# Patient Record
Sex: MALE | Race: WHITE | Employment: UNEMPLOYED | URBAN - METROPOLITAN AREA
[De-identification: names, ages, dates, MRNs, and addresses within clinical notes are randomized per-mention and may not be internally consistent; named-entity substitution may affect disease eponyms.]

---

## 2018-03-16 ENCOUNTER — HOSPITAL ENCOUNTER (EMERGENCY)
Age: 63
Discharge: HOME | End: 2018-03-16
Payer: MEDICAID

## 2018-03-16 VITALS
HEART RATE: 104 BPM | HEIGHT: 68 IN | SYSTOLIC BLOOD PRESSURE: 150 MMHG | TEMPERATURE: 97.6 F | WEIGHT: 147 LBS | OXYGEN SATURATION: 98 % | BODY MASS INDEX: 22.28 KG/M2 | DIASTOLIC BLOOD PRESSURE: 74 MMHG | RESPIRATION RATE: 18 BRPM

## 2018-03-16 DIAGNOSIS — R03.0 ELEVATED BLOOD PRESSURE READING: ICD-10-CM

## 2018-03-16 DIAGNOSIS — B37.0 THRUSH: Primary | ICD-10-CM

## 2018-03-16 LAB
ABSOLUTE EOS #: 0 K/UL (ref 0–0.4)
ABSOLUTE IMMATURE GRANULOCYTE: ABNORMAL K/UL (ref 0–0.3)
ABSOLUTE LYMPH #: 1.1 K/UL (ref 1–4.8)
ABSOLUTE MONO #: 0.5 K/UL (ref 0.2–0.8)
ANION GAP SERPL CALCULATED.3IONS-SCNC: 27 MMOL/L (ref 9–17)
BASOPHILS # BLD: 0 % (ref 0–2)
BASOPHILS ABSOLUTE: 0 K/UL (ref 0–0.2)
BUN BLDV-MCNC: 15 MG/DL (ref 8–23)
BUN/CREAT BLD: 16 (ref 9–20)
CALCIUM SERPL-MCNC: 8.8 MG/DL (ref 8.6–10.4)
CHLORIDE BLD-SCNC: 88 MMOL/L (ref 98–107)
CO2: 18 MMOL/L (ref 20–31)
CREAT SERPL-MCNC: 0.92 MG/DL (ref 0.7–1.2)
DIFFERENTIAL TYPE: ABNORMAL
EOSINOPHILS RELATIVE PERCENT: 0 % (ref 1–4)
GFR AFRICAN AMERICAN: >60 ML/MIN
GFR NON-AFRICAN AMERICAN: >60 ML/MIN
GFR SERPL CREATININE-BSD FRML MDRD: ABNORMAL ML/MIN/{1.73_M2}
GFR SERPL CREATININE-BSD FRML MDRD: ABNORMAL ML/MIN/{1.73_M2}
GLUCOSE BLD-MCNC: 65 MG/DL (ref 70–99)
HCT VFR BLD CALC: 44.3 % (ref 41–53)
HEMOGLOBIN: 14.6 G/DL (ref 13.5–17.5)
IMMATURE GRANULOCYTES: ABNORMAL %
LYMPHOCYTES # BLD: 11 % (ref 24–44)
MCH RBC QN AUTO: 32.2 PG (ref 26–34)
MCHC RBC AUTO-ENTMCNC: 32.9 G/DL (ref 31–37)
MCV RBC AUTO: 97.6 FL (ref 80–100)
MONOCYTES # BLD: 5 % (ref 1–7)
NRBC AUTOMATED: ABNORMAL PER 100 WBC
PDW BLD-RTO: 14.4 % (ref 11.5–14.5)
PLATELET # BLD: 236 K/UL (ref 130–400)
PLATELET ESTIMATE: ABNORMAL
PMV BLD AUTO: 7.7 FL (ref 6–12)
POTASSIUM SERPL-SCNC: 5.5 MMOL/L (ref 3.7–5.3)
RBC # BLD: 4.53 M/UL (ref 4.5–5.9)
RBC # BLD: ABNORMAL 10*6/UL
SEG NEUTROPHILS: 84 % (ref 36–66)
SEGMENTED NEUTROPHILS ABSOLUTE COUNT: 9 K/UL (ref 1.8–7.7)
SODIUM BLD-SCNC: 133 MMOL/L (ref 135–144)
WBC # BLD: 10.8 K/UL (ref 3.5–11)
WBC # BLD: ABNORMAL 10*3/UL

## 2018-03-16 PROCEDURE — 99282 EMERGENCY DEPT VISIT SF MDM: CPT

## 2018-03-16 PROCEDURE — 85025 COMPLETE CBC W/AUTO DIFF WBC: CPT

## 2018-03-16 PROCEDURE — 80048 BASIC METABOLIC PNL TOTAL CA: CPT

## 2018-03-16 RX ORDER — DIPHENHYDRAMINE HCL 25 MG
25 CAPSULE ORAL EVERY 6 HOURS PRN
COMMUNITY
End: 2018-07-30

## 2018-03-16 ASSESSMENT — PAIN DESCRIPTION - PAIN TYPE: TYPE: ACUTE PAIN

## 2018-03-16 ASSESSMENT — PAIN SCALES - GENERAL: PAINLEVEL_OUTOF10: 10

## 2018-03-16 ASSESSMENT — PAIN DESCRIPTION - DESCRIPTORS: DESCRIPTORS: BURNING;TENDER

## 2018-03-17 NOTE — ED PROVIDER NOTES
following:     Glucose 65 (*)     Sodium 133 (*)     Potassium 5.5 (*)     Chloride 88 (*)     CO2 18 (*)     Anion Gap 27 (*)     All other components within normal limits       All other labs were within normal range or not returned as of this dictation. EMERGENCY DEPARTMENT COURSE and DIFFERENTIAL DIAGNOSIS/MDM:   Vitals:    Vitals:    18 1459   BP: (!) 150/74   Pulse: 104   Resp: 18   Temp: 97.6 °F (36.4 °C)   TempSrc: Oral   SpO2: 98%   Weight: 147 lb (66.7 kg)   Height: 5' 8\" (1.727 m)       Medical Decision Makin-year-old male with thrush. This is the second time he has had thrush but denies immunocompromised status. This episode did not resolve with his first treatment of nystatin. He has not seen a physician in several years and some basic blood work was performed without any significant abnormalities. He was discharged home with nystatin. Patient and family at the bedside were educated on the importance of establishing himself with a physician. FINAL IMPRESSION      1. Thrush    2. Elevated blood pressure reading          DISPOSITION/PLAN   DISPOSITION Decision To Discharge 2018 05:09:08 PM      PATIENT REFERRED TO:   Follow-up with a primary care provider  [id number]-DAY          DISCHARGE MEDICATIONS:     Discharge Medication List as of 3/16/2018  5:14 PM      START taking these medications    Details   nystatin (MYCOSTATIN) 794082 UNIT/ML suspension Take 5 mLs by mouth 4 times daily for 10 days Take 5 mL's by mouth 4 times daily. Swish and spit, Oral, 4 TIMES DAILY Starting Fri 3/16/2018, Until Mon 3/26/2018, 40 doses, Disp-200 mL, R-0, Print             (Please note that portions of this note were completed with a voice recognition program.  Efforts were made to edit the dictations but occasionally words are mis-transcribed. )    RICHELLE CALVO NP  [id number]

## 2018-03-19 ASSESSMENT — ENCOUNTER SYMPTOMS
COUGH: 0
CHEST TIGHTNESS: 0
SHORTNESS OF BREATH: 0
ABDOMINAL DISTENTION: 0
NAUSEA: 1
VOMITING: 0
PHOTOPHOBIA: 0

## 2018-03-19 NOTE — ED PROVIDER NOTES
ULTRASOUND:   Performed by ED Physician - none    LABS:  Labs Reviewed   CBC WITH AUTO DIFFERENTIAL - Abnormal; Notable for the following:        Result Value    Seg Neutrophils 84 (*)     Lymphocytes 11 (*)     Eosinophils % 0 (*)     Segs Absolute 9.00 (*)     All other components within normal limits   BASIC METABOLIC PANEL - Abnormal; Notable for the following:     Glucose 65 (*)     Sodium 133 (*)     Potassium 5.5 (*)     Chloride 88 (*)     CO2 18 (*)     Anion Gap 27 (*)     All other components within normal limits       All other labs were within normal range or not returned as of this dictation. EMERGENCY DEPARTMENT COURSE and DIFFERENTIAL DIAGNOSIS/MDM:   Vitals:    Vitals:    03/16/18 1459   BP: (!) 150/74   Pulse: 104   Resp: 18   Temp: 97.6 °F (36.4 °C)   TempSrc: Oral   SpO2: 98%   Weight: 147 lb (66.7 kg)   Height: 5' 8\" (1.727 m)     Does have exudative lesions in the mouth that are consistent with oral candidiasis. His blood pressure was also noted to be elevated and we did have a discussion about blood pressure monitoring and follow-up care I don't think at this point that we need to initiate antihypertensive medication. CONSULTS:  None    PROCEDURES:  Not clinically indicated. FINAL IMPRESSION      1. Thrush    2. Elevated blood pressure reading          DISPOSITION/PLAN   DISPOSITION Decision To Discharge 03/16/2018 05:09:08 PM      PATIENT REFERRED TO:   Follow-up with a primary care provider  [id number]-DAY          DISCHARGE MEDICATIONS:     Discharge Medication List as of 3/16/2018  5:14 PM      START taking these medications    Details   nystatin (MYCOSTATIN) [id number] UNIT/ML suspension Take 5 mLs by mouth 4 times daily for 10 days Take 5 mL's by mouth 4 times daily.   Swish and spit, Oral, 4 TIMES DAILY Starting Fri 3/16/2018, Until Mon 3/26/2018, 40 doses, Disp-200 mL, R-0, Print                 (Please note that portions of this note were completed with a voice recognition

## 2018-03-21 ENCOUNTER — OFFICE VISIT (OUTPATIENT)
Dept: FAMILY MEDICINE CLINIC | Age: 63
End: 2018-03-21
Payer: MEDICAID

## 2018-03-21 VITALS
HEART RATE: 72 BPM | HEIGHT: 68 IN | WEIGHT: 153.6 LBS | SYSTOLIC BLOOD PRESSURE: 158 MMHG | BODY MASS INDEX: 23.28 KG/M2 | OXYGEN SATURATION: 99 % | DIASTOLIC BLOOD PRESSURE: 85 MMHG | RESPIRATION RATE: 16 BRPM

## 2018-03-21 DIAGNOSIS — J44.9 CHRONIC OBSTRUCTIVE PULMONARY DISEASE, UNSPECIFIED COPD TYPE (HCC): ICD-10-CM

## 2018-03-21 DIAGNOSIS — Z76.89 ENCOUNTER TO ESTABLISH CARE: Primary | ICD-10-CM

## 2018-03-21 DIAGNOSIS — F17.200 SMOKING: ICD-10-CM

## 2018-03-21 DIAGNOSIS — I10 ESSENTIAL HYPERTENSION: ICD-10-CM

## 2018-03-21 DIAGNOSIS — B37.0 CANDIDAL STOMATITIS: ICD-10-CM

## 2018-03-21 PROCEDURE — 99204 OFFICE O/P NEW MOD 45 MIN: CPT | Performed by: FAMILY MEDICINE

## 2018-03-21 RX ORDER — CHLORTHALIDONE 25 MG/1
25 TABLET ORAL DAILY
Qty: 30 TABLET | Refills: 1 | Status: SHIPPED | OUTPATIENT
Start: 2018-03-21 | End: 2018-03-27 | Stop reason: ALTCHOICE

## 2018-03-21 RX ORDER — VARENICLINE TARTRATE 25 MG
KIT ORAL
Qty: 1 EACH | Refills: 0 | Status: SHIPPED | OUTPATIENT
Start: 2018-03-21 | End: 2018-10-30

## 2018-03-21 ASSESSMENT — ENCOUNTER SYMPTOMS
BLOOD IN STOOL: 0
EYE PAIN: 0
SHORTNESS OF BREATH: 0
WHEEZING: 0

## 2018-03-21 NOTE — PROGRESS NOTES
stomatitis  Healing well with nystatin, encouraged him to complete the nystatin course. Follow-up in one month to recheck blood pressure and follow-up on Chantix for smoking. Fit kit given, he is interested in colonoscopy once he gets approved for his insurance    Flu shot declined. Recommended heading to the health department for the Pneumovax vaccine. Call or return to clinic prn if these symptoms worsen or fail to improve as anticipated. I have reviewed the instructions with the patient, answering all questions to her satisfaction.     Electronically signed by Rosalva Mejia MD on 3/21/2018 at 10:46 AM

## 2018-03-26 ENCOUNTER — HOSPITAL ENCOUNTER (OUTPATIENT)
Age: 63
Setting detail: SPECIMEN
Discharge: HOME | End: 2018-03-26
Payer: MEDICAID

## 2018-03-26 DIAGNOSIS — Z76.89 ENCOUNTER TO ESTABLISH CARE: ICD-10-CM

## 2018-03-26 LAB
ABSOLUTE EOS #: 0.58 K/UL (ref 0–0.44)
ABSOLUTE IMMATURE GRANULOCYTE: 0.07 K/UL (ref 0–0.3)
ABSOLUTE LYMPH #: 1.76 K/UL (ref 1.1–3.7)
ABSOLUTE MONO #: 1.03 K/UL (ref 0.1–1.2)
ALBUMIN SERPL-MCNC: 4.8 G/DL (ref 3.5–5.2)
ALBUMIN/GLOBULIN RATIO: 1.7 (ref 1–2.5)
ALP BLD-CCNC: 66 U/L (ref 40–129)
ALT SERPL-CCNC: 75 U/L (ref 5–41)
ANION GAP SERPL CALCULATED.3IONS-SCNC: 19 MMOL/L (ref 9–17)
AST SERPL-CCNC: 56 U/L
BASOPHILS # BLD: 1 % (ref 0–2)
BASOPHILS ABSOLUTE: 0.06 K/UL (ref 0–0.2)
BILIRUB SERPL-MCNC: 1.18 MG/DL (ref 0.3–1.2)
BUN BLDV-MCNC: 14 MG/DL (ref 8–23)
BUN/CREAT BLD: ABNORMAL (ref 9–20)
CALCIUM SERPL-MCNC: 9.5 MG/DL (ref 8.6–10.4)
CHLORIDE BLD-SCNC: 81 MMOL/L (ref 98–107)
CHOLESTEROL/HDL RATIO: 1.9
CHOLESTEROL: 184 MG/DL
CO2: 27 MMOL/L (ref 20–31)
CREAT SERPL-MCNC: 0.75 MG/DL (ref 0.7–1.2)
DIFFERENTIAL TYPE: ABNORMAL
EOSINOPHILS RELATIVE PERCENT: 7 % (ref 1–4)
GFR AFRICAN AMERICAN: >60 ML/MIN
GFR NON-AFRICAN AMERICAN: >60 ML/MIN
GFR SERPL CREATININE-BSD FRML MDRD: ABNORMAL ML/MIN/{1.73_M2}
GFR SERPL CREATININE-BSD FRML MDRD: ABNORMAL ML/MIN/{1.73_M2}
GLUCOSE BLD-MCNC: 76 MG/DL (ref 70–99)
HCT VFR BLD CALC: 43.3 % (ref 40.7–50.3)
HDLC SERPL-MCNC: 99 MG/DL
HEMOGLOBIN: 14.8 G/DL (ref 13–17)
HEPATITIS B SURFACE ANTIGEN: NONREACTIVE
HEPATITIS C ANTIBODY: NONREACTIVE
HIV AG/AB: NONREACTIVE
IMMATURE GRANULOCYTES: 1 %
LDL CHOLESTEROL: 70 MG/DL (ref 0–130)
LYMPHOCYTES # BLD: 22 % (ref 24–43)
MCH RBC QN AUTO: 32.2 PG (ref 25.2–33.5)
MCHC RBC AUTO-ENTMCNC: 34.2 G/DL (ref 28.4–34.8)
MCV RBC AUTO: 94.3 FL (ref 82.6–102.9)
MONOCYTES # BLD: 13 % (ref 3–12)
NRBC AUTOMATED: 0 PER 100 WBC
PDW BLD-RTO: 12.9 % (ref 11.8–14.4)
PLATELET # BLD: 279 K/UL (ref 138–453)
PLATELET ESTIMATE: ABNORMAL
PMV BLD AUTO: 10 FL (ref 8.1–13.5)
POTASSIUM SERPL-SCNC: 4 MMOL/L (ref 3.7–5.3)
RBC # BLD: 4.59 M/UL (ref 4.21–5.77)
RBC # BLD: ABNORMAL 10*6/UL
SEG NEUTROPHILS: 56 % (ref 36–65)
SEGMENTED NEUTROPHILS ABSOLUTE COUNT: 4.62 K/UL (ref 1.5–8.1)
SODIUM BLD-SCNC: 127 MMOL/L (ref 135–144)
T. PALLIDUM, IGG: NONREACTIVE
TOTAL PROTEIN: 7.6 G/DL (ref 6.4–8.3)
TRIGL SERPL-MCNC: 75 MG/DL
TSH SERPL DL<=0.05 MIU/L-ACNC: 2.99 MIU/L (ref 0.3–5)
VLDLC SERPL CALC-MCNC: NORMAL MG/DL (ref 1–30)
WBC # BLD: 8.1 K/UL (ref 3.5–11.3)
WBC # BLD: ABNORMAL 10*3/UL

## 2018-03-27 DIAGNOSIS — R79.89 LOW SERUM SODIUM: ICD-10-CM

## 2018-03-27 DIAGNOSIS — E78.5 HYPERLIPIDEMIA, UNSPECIFIED HYPERLIPIDEMIA TYPE: Primary | ICD-10-CM

## 2018-03-27 DIAGNOSIS — R74.8 LIVER ENZYME ELEVATION: ICD-10-CM

## 2018-03-27 DIAGNOSIS — I10 ESSENTIAL HYPERTENSION: ICD-10-CM

## 2018-03-27 RX ORDER — ATORVASTATIN CALCIUM 40 MG/1
40 TABLET, FILM COATED ORAL DAILY
Qty: 90 TABLET | Refills: 3 | Status: SHIPPED | OUTPATIENT
Start: 2018-03-27 | End: 2019-03-25 | Stop reason: SDUPTHER

## 2018-03-27 RX ORDER — AMLODIPINE BESYLATE 5 MG/1
5 TABLET ORAL DAILY
Qty: 30 TABLET | Refills: 3 | Status: SHIPPED | OUTPATIENT
Start: 2018-03-27 | End: 2018-04-16 | Stop reason: SDUPTHER

## 2018-04-13 ENCOUNTER — HOSPITAL ENCOUNTER (OUTPATIENT)
Age: 63
Setting detail: SPECIMEN
Discharge: HOME | End: 2018-04-13
Payer: MEDICAID

## 2018-04-13 DIAGNOSIS — R79.89 LOW SERUM SODIUM: ICD-10-CM

## 2018-04-13 DIAGNOSIS — R74.8 LIVER ENZYME ELEVATION: ICD-10-CM

## 2018-04-13 LAB
ALBUMIN SERPL-MCNC: 4.1 G/DL (ref 3.5–5.2)
ALBUMIN/GLOBULIN RATIO: 1.5 (ref 1–2.5)
ALP BLD-CCNC: 64 U/L (ref 40–129)
ALT SERPL-CCNC: 76 U/L (ref 5–41)
ANION GAP SERPL CALCULATED.3IONS-SCNC: 20 MMOL/L (ref 9–17)
AST SERPL-CCNC: 75 U/L
BILIRUB SERPL-MCNC: 0.97 MG/DL (ref 0.3–1.2)
BILIRUBIN DIRECT: 0.28 MG/DL
BILIRUBIN, INDIRECT: 0.69 MG/DL (ref 0–1)
CHLORIDE BLD-SCNC: 93 MMOL/L (ref 98–107)
CO2: 24 MMOL/L (ref 20–31)
CREAT SERPL-MCNC: 0.71 MG/DL (ref 0.7–1.2)
GFR AFRICAN AMERICAN: >60 ML/MIN
GFR NON-AFRICAN AMERICAN: >60 ML/MIN
GFR SERPL CREATININE-BSD FRML MDRD: NORMAL ML/MIN/{1.73_M2}
GFR SERPL CREATININE-BSD FRML MDRD: NORMAL ML/MIN/{1.73_M2}
GGT: 92 U/L (ref 8–61)
GLOBULIN: ABNORMAL G/DL (ref 1.5–3.8)
POTASSIUM SERPL-SCNC: 4.4 MMOL/L (ref 3.7–5.3)
SERUM OSMOLALITY: 280 MOSM/KG (ref 275–295)
SODIUM BLD-SCNC: 137 MMOL/L (ref 135–144)
TOTAL PROTEIN: 6.8 G/DL (ref 6.4–8.3)

## 2018-04-16 ENCOUNTER — OFFICE VISIT (OUTPATIENT)
Dept: FAMILY MEDICINE CLINIC | Age: 63
End: 2018-04-16
Payer: MEDICAID

## 2018-04-16 VITALS
HEIGHT: 67 IN | WEIGHT: 150.4 LBS | HEART RATE: 76 BPM | BODY MASS INDEX: 23.61 KG/M2 | DIASTOLIC BLOOD PRESSURE: 73 MMHG | SYSTOLIC BLOOD PRESSURE: 147 MMHG

## 2018-04-16 DIAGNOSIS — L30.9 DERMATITIS: ICD-10-CM

## 2018-04-16 DIAGNOSIS — R74.8 LIVER ENZYME ELEVATION: Primary | ICD-10-CM

## 2018-04-16 DIAGNOSIS — K13.29 TONGUE PLAQUE: Primary | ICD-10-CM

## 2018-04-16 DIAGNOSIS — I10 ESSENTIAL HYPERTENSION: ICD-10-CM

## 2018-04-16 DIAGNOSIS — R74.8 LIVER ENZYME ELEVATION: ICD-10-CM

## 2018-04-16 PROCEDURE — 99214 OFFICE O/P EST MOD 30 MIN: CPT | Performed by: FAMILY MEDICINE

## 2018-04-16 RX ORDER — BETAMETHASONE DIPROPIONATE 0.05 %
OINTMENT (GRAM) TOPICAL
Qty: 50 G | Refills: 1 | Status: SHIPPED | OUTPATIENT
Start: 2018-04-16 | End: 2018-10-30

## 2018-04-16 RX ORDER — AMLODIPINE BESYLATE 10 MG/1
10 TABLET ORAL DAILY
Qty: 30 TABLET | Refills: 3 | Status: SHIPPED | OUTPATIENT
Start: 2018-04-16 | End: 2018-07-30 | Stop reason: SDUPTHER

## 2018-04-16 RX ORDER — HYDROXYZINE HYDROCHLORIDE 25 MG/1
25 TABLET, FILM COATED ORAL 3 TIMES DAILY PRN
Qty: 60 TABLET | Refills: 0 | Status: SHIPPED | OUTPATIENT
Start: 2018-04-16 | End: 2018-04-26

## 2018-04-16 ASSESSMENT — ENCOUNTER SYMPTOMS
EYE PAIN: 0
SHORTNESS OF BREATH: 0
BLOOD IN STOOL: 0

## 2018-04-16 ASSESSMENT — PATIENT HEALTH QUESTIONNAIRE - PHQ9
1. LITTLE INTEREST OR PLEASURE IN DOING THINGS: 0
SUM OF ALL RESPONSES TO PHQ QUESTIONS 1-9: 0
SUM OF ALL RESPONSES TO PHQ9 QUESTIONS 1 & 2: 0
2. FEELING DOWN, DEPRESSED OR HOPELESS: 0

## 2018-04-26 ENCOUNTER — HOSPITAL ENCOUNTER (OUTPATIENT)
Dept: ULTRASOUND IMAGING | Age: 63
Discharge: HOME | End: 2018-04-28
Payer: MEDICAID

## 2018-04-26 DIAGNOSIS — R74.8 LIVER ENZYME ELEVATION: ICD-10-CM

## 2018-04-26 PROCEDURE — 76705 ECHO EXAM OF ABDOMEN: CPT

## 2018-04-30 ENCOUNTER — OFFICE VISIT (OUTPATIENT)
Dept: FAMILY MEDICINE CLINIC | Age: 63
End: 2018-04-30
Payer: MEDICAID

## 2018-04-30 VITALS
HEART RATE: 89 BPM | SYSTOLIC BLOOD PRESSURE: 138 MMHG | OXYGEN SATURATION: 95 % | BODY MASS INDEX: 23.39 KG/M2 | HEIGHT: 67 IN | DIASTOLIC BLOOD PRESSURE: 82 MMHG | WEIGHT: 149 LBS | RESPIRATION RATE: 16 BRPM

## 2018-04-30 DIAGNOSIS — F17.200 SMOKING: ICD-10-CM

## 2018-04-30 DIAGNOSIS — R74.8 LIVER ENZYME ELEVATION: Primary | ICD-10-CM

## 2018-04-30 DIAGNOSIS — I10 ESSENTIAL HYPERTENSION: ICD-10-CM

## 2018-04-30 DIAGNOSIS — K13.29 TONGUE PLAQUE: ICD-10-CM

## 2018-04-30 PROBLEM — B37.0 CANDIDAL STOMATITIS: Status: RESOLVED | Noted: 2018-03-21 | Resolved: 2018-04-30

## 2018-04-30 PROCEDURE — 99214 OFFICE O/P EST MOD 30 MIN: CPT | Performed by: FAMILY MEDICINE

## 2018-04-30 RX ORDER — FLUCONAZOLE 100 MG/1
TABLET ORAL
Qty: 22 TABLET | Refills: 0 | Status: SHIPPED | OUTPATIENT
Start: 2018-04-30 | End: 2018-07-30

## 2018-04-30 ASSESSMENT — ENCOUNTER SYMPTOMS
BLOOD IN STOOL: 0
SHORTNESS OF BREATH: 0
EYE PAIN: 0

## 2018-05-15 ENCOUNTER — HOSPITAL ENCOUNTER (OUTPATIENT)
Age: 63
Setting detail: SPECIMEN
Discharge: HOME | End: 2018-05-15
Payer: MEDICAID

## 2018-05-15 DIAGNOSIS — R74.8 LIVER ENZYME ELEVATION: ICD-10-CM

## 2018-05-15 LAB
ALBUMIN SERPL-MCNC: 4.3 G/DL (ref 3.5–5.2)
ALBUMIN/GLOBULIN RATIO: 1.7 (ref 1–2.5)
ALP BLD-CCNC: 53 U/L (ref 40–129)
ALT SERPL-CCNC: 41 U/L (ref 5–41)
ANION GAP SERPL CALCULATED.3IONS-SCNC: 14 MMOL/L (ref 9–17)
AST SERPL-CCNC: 37 U/L
BILIRUB SERPL-MCNC: 0.39 MG/DL (ref 0.3–1.2)
BILIRUBIN DIRECT: 0.13 MG/DL
BILIRUBIN, INDIRECT: 0.26 MG/DL (ref 0–1)
CHLORIDE BLD-SCNC: 99 MMOL/L (ref 98–107)
CO2: 23 MMOL/L (ref 20–31)
CREAT SERPL-MCNC: 0.66 MG/DL (ref 0.7–1.2)
GFR AFRICAN AMERICAN: >60 ML/MIN
GFR NON-AFRICAN AMERICAN: >60 ML/MIN
GFR SERPL CREATININE-BSD FRML MDRD: ABNORMAL ML/MIN/{1.73_M2}
GFR SERPL CREATININE-BSD FRML MDRD: ABNORMAL ML/MIN/{1.73_M2}
GLOBULIN: NORMAL G/DL (ref 1.5–3.8)
POTASSIUM SERPL-SCNC: 4 MMOL/L (ref 3.7–5.3)
SODIUM BLD-SCNC: 136 MMOL/L (ref 135–144)
TOTAL PROTEIN: 6.8 G/DL (ref 6.4–8.3)

## 2018-05-31 ENCOUNTER — OFFICE VISIT (OUTPATIENT)
Dept: DERMATOLOGY | Age: 63
End: 2018-05-31
Payer: MEDICAID

## 2018-05-31 VITALS
SYSTOLIC BLOOD PRESSURE: 136 MMHG | DIASTOLIC BLOOD PRESSURE: 79 MMHG | BODY MASS INDEX: 23.39 KG/M2 | HEIGHT: 67 IN | HEART RATE: 82 BPM | WEIGHT: 149 LBS | OXYGEN SATURATION: 96 %

## 2018-05-31 DIAGNOSIS — K13.0 CHEILITIS: ICD-10-CM

## 2018-05-31 DIAGNOSIS — B37.0 THRUSH: ICD-10-CM

## 2018-05-31 DIAGNOSIS — L30.8 ASTEATOTIC ECZEMA: Primary | ICD-10-CM

## 2018-05-31 PROCEDURE — 3017F COLORECTAL CA SCREEN DOC REV: CPT | Performed by: DERMATOLOGY

## 2018-05-31 PROCEDURE — 99202 OFFICE O/P NEW SF 15 MIN: CPT | Performed by: DERMATOLOGY

## 2018-05-31 PROCEDURE — G8420 CALC BMI NORM PARAMETERS: HCPCS | Performed by: DERMATOLOGY

## 2018-05-31 PROCEDURE — G8427 DOCREV CUR MEDS BY ELIG CLIN: HCPCS | Performed by: DERMATOLOGY

## 2018-05-31 PROCEDURE — 4004F PT TOBACCO SCREEN RCVD TLK: CPT | Performed by: DERMATOLOGY

## 2018-07-30 ENCOUNTER — OFFICE VISIT (OUTPATIENT)
Dept: FAMILY MEDICINE CLINIC | Age: 63
End: 2018-07-30
Payer: MEDICAID

## 2018-07-30 VITALS
BODY MASS INDEX: 23.67 KG/M2 | DIASTOLIC BLOOD PRESSURE: 80 MMHG | HEIGHT: 67 IN | SYSTOLIC BLOOD PRESSURE: 137 MMHG | WEIGHT: 150.8 LBS | RESPIRATION RATE: 16 BRPM | HEART RATE: 66 BPM | OXYGEN SATURATION: 98 %

## 2018-07-30 DIAGNOSIS — I10 ESSENTIAL HYPERTENSION: Primary | ICD-10-CM

## 2018-07-30 DIAGNOSIS — Z23 NEED FOR SHINGLES VACCINE: ICD-10-CM

## 2018-07-30 DIAGNOSIS — Z12.11 SCREEN FOR COLON CANCER: ICD-10-CM

## 2018-07-30 DIAGNOSIS — F17.200 SMOKING: ICD-10-CM

## 2018-07-30 PROBLEM — K13.29 TONGUE PLAQUE: Status: RESOLVED | Noted: 2018-04-16 | Resolved: 2018-07-30

## 2018-07-30 PROBLEM — R74.8 LIVER ENZYME ELEVATION: Status: RESOLVED | Noted: 2018-03-27 | Resolved: 2018-07-30

## 2018-07-30 PROCEDURE — G8427 DOCREV CUR MEDS BY ELIG CLIN: HCPCS | Performed by: FAMILY MEDICINE

## 2018-07-30 PROCEDURE — 99406 BEHAV CHNG SMOKING 3-10 MIN: CPT | Performed by: FAMILY MEDICINE

## 2018-07-30 PROCEDURE — G8420 CALC BMI NORM PARAMETERS: HCPCS | Performed by: FAMILY MEDICINE

## 2018-07-30 PROCEDURE — 99213 OFFICE O/P EST LOW 20 MIN: CPT | Performed by: FAMILY MEDICINE

## 2018-07-30 PROCEDURE — 3017F COLORECTAL CA SCREEN DOC REV: CPT | Performed by: FAMILY MEDICINE

## 2018-07-30 PROCEDURE — 4004F PT TOBACCO SCREEN RCVD TLK: CPT | Performed by: FAMILY MEDICINE

## 2018-07-30 RX ORDER — BUPROPION HYDROCHLORIDE 150 MG/1
150 TABLET, EXTENDED RELEASE ORAL 2 TIMES DAILY
Qty: 60 TABLET | Refills: 2 | Status: SHIPPED | OUTPATIENT
Start: 2018-07-30 | End: 2019-03-20

## 2018-07-30 RX ORDER — AMLODIPINE BESYLATE 10 MG/1
10 TABLET ORAL DAILY
Qty: 90 TABLET | Refills: 1 | Status: SHIPPED | OUTPATIENT
Start: 2018-07-30 | End: 2018-10-30 | Stop reason: SDUPTHER

## 2018-07-30 ASSESSMENT — ENCOUNTER SYMPTOMS: SHORTNESS OF BREATH: 0

## 2018-07-30 NOTE — PROGRESS NOTES
then 0.5 mg twice daily for 4 days, and then 1 mg twice daily for the remainder of a 12-week course., Disp: 1 each, Rfl: 0    Subjective:     Review of Systems   Constitutional: Negative for fever. Respiratory: Negative for shortness of breath. Cardiovascular: Negative for chest pain and leg swelling. Objective:     /80   Pulse 66   Resp 16   Ht 5' 7.01\" (1.702 m)   Wt 150 lb 12.8 oz (68.4 kg)   SpO2 98%   BMI 23.61 kg/m²     Physical Exam   Constitutional: He is oriented to person, place, and time. He appears well-developed. Eyes: Conjunctivae and EOM are normal.   Cardiovascular: Normal heart sounds. No murmur heard. Pulmonary/Chest: Effort normal and breath sounds normal. No respiratory distress. He has no wheezes. Neurological: He is alert and oriented to person, place, and time. Skin: He is not diaphoretic. Psychiatric: He has a normal mood and affect. His behavior is normal. Judgment and thought content normal.       Assessment & Plan:      1. Essential hypertension  BP at target, continue norvasc  - amLODIPine (NORVASC) 10 MG tablet; Take 1 tablet by mouth daily  Dispense: 90 tablet; Refill: 1    2. Smoking  Had a min 3 min discussion regarding smoking cessation options, side effects, and behavioral counseling. Reviewed chantix, buproprion, nicotine replacement. Behavioral modifications - don't smoke in the house or in the car. Stay active. Find support in family members and friends. Follow up in 1 month. Sooner if needed. - [place] TOBACCO USE CESSATION INTERMEDIATE 3-10 MINUTES  - buPROPion (ZYBAN) 150 MG extended release tablet; Take 1 tablet by mouth 2 times daily  Dispense: 60 tablet; Refill: 2    3. Screen for colon cancer  - Viktoriya Silvestre MD, [facility]*    4. Need for shingles vaccine  - zoster recombinant adjuvanted vaccine (SHINGRIX) 50 MCG SUSR injection;  Inject 0.5 mLs into the muscle once for 1 dose  Dispense: 0.5 mL; Refill: 1    Recommended heading to the health department for the Pneumovax vaccine. Call or return to clinic prn if these symptoms worsen or fail to improve as anticipated. I have reviewed the instructions with the patient, answering all questions to their satisfaction.     Electronically signed by Stephen Carvajal MD on 7/30/2018 at 9:29 AM

## 2018-07-30 NOTE — PROGRESS NOTES
Visit Information    Have you changed or started any medications since your last visit including any over-the-counter medicines, vitamins, or herbal medicines? no   Are you having any side effects from any of your medications? -  no  Have you stopped taking any of your medications? Is so, why? -  no    Have you seen any other physician or provider since your last visit? No  Have you had any other diagnostic tests since your last visit? No  Have you been seen in the emergency room and/or had an admission to a hospital since we last saw you? No  Have you had your routine dental cleaning in the past 6 months? no    Have you activated your [employer] account? If not, what are your barriers?  Yes     Patient Care Team:  Harmony Jordan MD as PCP - General (Family Medicine)    Medical History Review  Past Medical, Family, and Social History reviewed and does not contribute to the patient presenting condition    Health Maintenance   Topic Date Due    DTaP/Tdap/Td vaccine (1 - Tdap) 06/05/1976    Pneumococcal med risk (1 of 1 - PPSV23) 06/05/1976    Shingles Vaccine (1 of 2 - 2 Dose Series) 06/05/2007    Colon cancer screen colonoscopy  06/05/2007    Flu vaccine (1) 09/01/2018    Potassium monitoring  05/15/2019    Creatinine monitoring  05/15/2019    Lipid screen  03/26/2023    Hepatitis C screen  Completed    HIV screen  Completed

## 2018-08-21 ENCOUNTER — APPOINTMENT (OUTPATIENT)
Dept: GENERAL RADIOLOGY | Age: 63
End: 2018-08-21
Payer: MEDICAID

## 2018-08-21 ENCOUNTER — HOSPITAL ENCOUNTER (EMERGENCY)
Age: 63
Discharge: HOME | End: 2018-08-21
Attending: EMERGENCY MEDICINE
Payer: MEDICAID

## 2018-08-21 VITALS
SYSTOLIC BLOOD PRESSURE: 144 MMHG | HEART RATE: 88 BPM | RESPIRATION RATE: 18 BRPM | BODY MASS INDEX: 23.54 KG/M2 | DIASTOLIC BLOOD PRESSURE: 78 MMHG | TEMPERATURE: 98 F | WEIGHT: 150 LBS | OXYGEN SATURATION: 98 % | HEIGHT: 67 IN

## 2018-08-21 DIAGNOSIS — S92.415A CLOSED NONDISPLACED FRACTURE OF PROXIMAL PHALANX OF LEFT GREAT TOE, INITIAL ENCOUNTER: Primary | ICD-10-CM

## 2018-08-21 DIAGNOSIS — M25.562 ACUTE PAIN OF LEFT KNEE: ICD-10-CM

## 2018-08-21 PROCEDURE — 99283 EMERGENCY DEPT VISIT LOW MDM: CPT

## 2018-08-21 PROCEDURE — 73630 X-RAY EXAM OF FOOT: CPT

## 2018-08-21 PROCEDURE — 73562 X-RAY EXAM OF KNEE 3: CPT

## 2018-08-21 PROCEDURE — 93971 EXTREMITY STUDY: CPT

## 2018-08-21 RX ORDER — ACETAMINOPHEN AND CODEINE PHOSPHATE 300; 30 MG/1; MG/1
1 TABLET ORAL EVERY 4 HOURS PRN
Qty: 15 TABLET | Refills: 0 | Status: SHIPPED | OUTPATIENT
Start: 2018-08-21 | End: 2018-08-24

## 2018-08-21 RX ORDER — IBUPROFEN 800 MG/1
800 TABLET ORAL EVERY 8 HOURS PRN
Qty: 20 TABLET | Refills: 0 | Status: SHIPPED | OUTPATIENT
Start: 2018-08-21 | End: 2020-02-24

## 2018-08-21 ASSESSMENT — PAIN SCALES - GENERAL: PAINLEVEL_OUTOF10: 10

## 2018-08-21 ASSESSMENT — PAIN DESCRIPTION - DESCRIPTORS: DESCRIPTORS: ACHING;TENDER;THROBBING

## 2018-08-21 ASSESSMENT — PAIN DESCRIPTION - ORIENTATION: ORIENTATION: LEFT

## 2018-08-21 ASSESSMENT — ENCOUNTER SYMPTOMS: COLOR CHANGE: 1

## 2018-08-21 ASSESSMENT — PAIN DESCRIPTION - LOCATION: LOCATION: KNEE;TOE (COMMENT WHICH ONE)

## 2018-08-21 NOTE — ED NOTES
Pt to ED with knee ankle and foot pain, denies injury states he has Hx of gout, no swelling or contusion noted. Pt has been drinking for a few days.      Linda Dalton RN  08/21/18 0139

## 2018-08-21 NOTE — ED PROVIDER NOTES
[facility] ED  eMERGENCY dEPARTMENT eNCOUnter      Pt Name: Dave Crooks  MRN: [id number]  [place] 1955  Date of evaluation: 8/21/2018  Provider: Jacquelin Sanchez       Chief Complaint   Patient presents with    Knee Pain     lt knee and lt great toe pain with redness/swelling today(denies injury)         HISTORY OF PRESENT ILLNESS  (Location/Symptom, Timing/Onset, Context/Setting, Quality, Duration, Modifying Factors, Severity.)   Dave Crooks is a 66 y.o. male who presents to the emergency department By private auto for evaluation of left knee pain and swelling and left great toe pain and swelling at the patient noticed today when he woke up. He denies injuring himself. His pain as a 10 out of 10 and is aggravated with walking. He denies fever or chills. No known history of gout. Nursing Notes were reviewed. PAST MEDICAL HISTORY     Past Medical History:   Diagnosis Date    COPD (chronic obstructive pulmonary disease) ([facility] [address]          SURGICAL HISTORY       Past Surgical History:   Procedure Laterality Date    JOINT REPLACEMENT      lt hip         CURRENT MEDICATIONS       Discharge Medication List as of 8/21/2018  1:14 PM      CONTINUE these medications which have NOT CHANGED    Details   amLODIPine (NORVASC) 10 MG tablet Take 1 tablet by mouth daily, Disp-90 tablet, R-1Normal      buPROPion (ZYBAN) 150 MG extended release tablet Take 1 tablet by mouth 2 times daily, Disp-60 tablet, R-2Normal      betamethasone dipropionate (DIPROLENE) 0.05 % ointment Apply topically daily. , Disp-50 g, R-1, Normal      atorvastatin (LIPITOR) 40 MG tablet Take 1 tablet by mouth daily, Disp-90 tablet, R-3Normal      varenicline (CHANTIX STARTING MONTH PAK) 0.5 MG X 11 & 1 MG X 42 tablet Take 0.5 mg daily for 3 days, then 0.5 mg twice daily for 4 days, and then 1 mg twice daily for the remainder of a 12-week course., Disp-1 each, R-0Normal             ALLERGIES     Patient has swelling. Tenderness found. Lateral joint line tenderness noted. Left ankle: Normal.        Legs:       Left foot: There is decreased range of motion, tenderness and swelling. Feet:    Neurological: He is alert and oriented to person, place, and time. He has normal reflexes. No cranial nerve deficit. Coordination normal.   Skin: Skin is warm, dry and intact. There is erythema. Psychiatric: He has a normal mood and affect. His behavior is normal. Judgment and thought content normal.         DIAGNOSTIC RESULTS     EKG: All EKG's are interpreted by the Emergency Department Physician who either signs or Co-signs this chart in the absence of a cardiologist.        RADIOLOGY:   Non-plain film images such as CT, Ultrasound and MRI are read by the radiologist. July [place] radiographic images are visualized and preliminarily interpreted by the emergency physician with the below findings:    Xr Knee Left (3 Views)    Result Date: 8/21/2018  EXAMINATION: 3 XRAY VIEWS OF THE LEFT KNEE; 3 XRAY VIEWS OF THE LEFT FOOT 8/21/2018 12:10 pm COMPARISON: None. HISTORY: ORDERING SYSTEM PROVIDED HISTORY: Pain and swelling today, denies injury TECHNOLOGIST PROVIDED HISTORY: Pain and swelling today, denies injury Ordering Physician Provided Reason for Exam: Left knee pain and swelling x 1 day. No injury. Acuity: Acute Type of Exam: Initial FINDINGS: Left knee: Mineralization and alignment are satisfactory. No acute fracture, dislocation, or effusion is noted. Left foot: A nondisplaced obliques fracture through the medial base of the 1st proximal phalanx is noted. No dislocation is noted. Mild degenerative changes involve the interphalangeal joints. The patient has a large talar bunion with associated soft tissue swelling. 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 Left knee: No acute osseous abnormality. Left foot: Nondisplaced obliques fracture medial base of the 1st proximal phalanx. none    LABS:  Labs Reviewed - No data to display    All other labs were within normal range or not returned as of this dictation. EMERGENCY DEPARTMENT COURSE and DIFFERENTIAL DIAGNOSIS/MDM:   Patient was evaluated in conjunction with attending physician. Venous Doppler of left lower extremity negative for DVT. X-ray of left knee per Radiologist shows no acute osseous abnormality. X-ray of left foot per radiologist shows nondisplaced obliques fracture medial base of the 1st proximal phalanx. Findings discussed with the patient by Dr. Angélica Lizarraga. An Ace wrap was placed to the left knee by the nurse. A postop shoe was placed to the left foot by the nurse. I checked application of splint and found to be in appropriate position. Patient is neurovascularly intact. [place]AR[place] reviewed. He'll be discharged with Motrin and Tylenol codeine for pain. He'll be referred to podiatry for further evaluation of his toe injury. He is also instructed to follow-up with his family doctor for his knee pain. Vitals:    Vitals:    08/21/18 1151   BP: (!) 144/78   Pulse: 88   Resp: 18   Temp: 98 °F (36.7 °C)   TempSrc: Oral   SpO2: 98%   Weight: 150 lb (68 kg)   Height: 5' 7\" (1.702 m)         CONSULTS:  None    PROCEDURES:  Procedures    FINAL IMPRESSION      1. Closed nondisplaced fracture of proximal phalanx of left great toe, initial encounter    2.  Acute pain of left knee          DISPOSITION/PLAN   DISPOSITION        PATIENT REFERRED TO:   Dandre Tabares DPM  [facility]  [address]  [address] [address]    Schedule an appointment as soon as possible for a visit       Isidra Coffey [address] [facility]  [phone]    Schedule an appointment as soon as possible for a visit       [facility]  [address] [facility]  [phone]    As needed      DISCHARGE MEDICATIONS:     Discharge Medication List as of 8/21/2018  1:14 PM      START taking these medications    Details   ibuprofen (ADVIL;MOTRIN) 800 MG tablet Take 1 tablet by mouth every 8 hours as needed for Pain, Disp-20 tablet, R-0Print      acetaminophen-codeine (TYLENOL/CODEINE #3) 300-30 MG per tablet Take 1 tablet by mouth every 4 hours as needed for Pain for up to 3 days. ., Disp-15 tablet, R-0Print           Electronically signed by SRINIVAS Laguerre CNP on 8/21/2018 at 1:44 PM           SRINIVAS Laguerre CNP  08/21/18 2957

## 2018-10-30 ENCOUNTER — OFFICE VISIT (OUTPATIENT)
Dept: FAMILY MEDICINE CLINIC | Age: 63
End: 2018-10-30
Payer: MEDICAID

## 2018-10-30 VITALS
SYSTOLIC BLOOD PRESSURE: 134 MMHG | OXYGEN SATURATION: 98 % | DIASTOLIC BLOOD PRESSURE: 80 MMHG | HEART RATE: 89 BPM | HEIGHT: 67 IN | RESPIRATION RATE: 16 BRPM | WEIGHT: 155 LBS | BODY MASS INDEX: 24.33 KG/M2

## 2018-10-30 DIAGNOSIS — S92.415A CLOSED NONDISPLACED FRACTURE OF PROXIMAL PHALANX OF LEFT GREAT TOE, INITIAL ENCOUNTER: ICD-10-CM

## 2018-10-30 DIAGNOSIS — F17.200 SMOKING: ICD-10-CM

## 2018-10-30 DIAGNOSIS — B37.0 ORAL THRUSH: ICD-10-CM

## 2018-10-30 DIAGNOSIS — I10 ESSENTIAL HYPERTENSION: Primary | ICD-10-CM

## 2018-10-30 DIAGNOSIS — Z12.11 SCREEN FOR COLON CANCER: ICD-10-CM

## 2018-10-30 DIAGNOSIS — F10.10 ALCOHOL ABUSE: ICD-10-CM

## 2018-10-30 PROCEDURE — 99406 BEHAV CHNG SMOKING 3-10 MIN: CPT | Performed by: FAMILY MEDICINE

## 2018-10-30 PROCEDURE — 3017F COLORECTAL CA SCREEN DOC REV: CPT | Performed by: FAMILY MEDICINE

## 2018-10-30 PROCEDURE — G8427 DOCREV CUR MEDS BY ELIG CLIN: HCPCS | Performed by: FAMILY MEDICINE

## 2018-10-30 PROCEDURE — G8420 CALC BMI NORM PARAMETERS: HCPCS | Performed by: FAMILY MEDICINE

## 2018-10-30 PROCEDURE — 4004F PT TOBACCO SCREEN RCVD TLK: CPT | Performed by: FAMILY MEDICINE

## 2018-10-30 PROCEDURE — 99214 OFFICE O/P EST MOD 30 MIN: CPT | Performed by: FAMILY MEDICINE

## 2018-10-30 PROCEDURE — G8484 FLU IMMUNIZE NO ADMIN: HCPCS | Performed by: FAMILY MEDICINE

## 2018-10-30 RX ORDER — AMLODIPINE BESYLATE 10 MG/1
10 TABLET ORAL DAILY
Qty: 90 TABLET | Refills: 3 | Status: SHIPPED | OUTPATIENT
Start: 2018-10-30 | End: 2019-09-24 | Stop reason: SDUPTHER

## 2018-10-30 RX ORDER — FLUCONAZOLE 200 MG/1
200 TABLET ORAL DAILY
Qty: 10 TABLET | Refills: 0 | Status: SHIPPED | OUTPATIENT
Start: 2018-10-30 | End: 2018-11-09

## 2018-10-30 ASSESSMENT — ENCOUNTER SYMPTOMS
EYE PAIN: 0
SHORTNESS OF BREATH: 0
BLOOD IN STOOL: 0

## 2018-10-30 NOTE — PROGRESS NOTES
Scott Underwood MD  [facility]  [address] [place] [id number]  Dept: [phone]    Romain Davidson is a 66 y.o. male who presents today for hismedical conditions/complaints as noted below. Romain Davidson is here today c/o 3 Month Follow-Up and Other (pt states Zana Joshi is back, started Sunday)       HPI:     HPI    Follow-up for norvasc for bp   Denies chest pain, dizziness, headaches, dyspnea    Today history of recurrence of his oral thrush, in the past we tried nystatin topical, diflucan po - diflucan worked better    Started on zyban for smoking as Chantix not covered, hasn't started zyban as worried about s/e  Smoking 1 ppd still    Did not yet book his colonoscopy    ER Aug 21 for undisplaced closed fracture of L great toe, knee x-ray unremarkable, foot x-ray showed nondisplaced oblique fracture of the medial base of the first proximal phalanx along with sizable talar bunion, Doppler of the left lower extremity negative for DVT, left knee put an Ace wrap, given postop shoe, discharged with Tylenol No. 3, referred to podiatry for the toe fracture - did not go, still wearing postop shoe as he finds it more comfortable, pain is improved    Went back to drinking a sixpack of beer a day  LFTs were initially up but then came down on most recent recheck      Patient Active Problem List   Diagnosis    Chronic obstructive pulmonary disease ([facility] [address]    Smoking    Essential hypertension    Oral thrush    Hyperlipidemia    Closed nondisplaced fracture of proximal phalanx of left great toe    Alcohol abuse       Past Medical History:   Diagnosis Date    COPD (chronic obstructive pulmonary disease) ([address]      Past Surgical History:   Procedure Laterality Date    JOINT REPLACEMENT      lt hip     No family history on file.   Social History   Substance Use Topics    Smoking status: Current Every Day Smoker     Types: Cigarettes    Smokeless tobacco: Never Used

## 2019-01-08 ENCOUNTER — TELEPHONE (OUTPATIENT)
Dept: FAMILY MEDICINE CLINIC | Age: 64
End: 2019-01-08

## 2019-01-08 DIAGNOSIS — B37.0 ORAL THRUSH: Primary | ICD-10-CM

## 2019-01-08 RX ORDER — FLUCONAZOLE 100 MG/1
TABLET ORAL
Qty: 8 TABLET | Refills: 0 | Status: SHIPPED | OUTPATIENT
Start: 2019-01-08 | End: 2019-09-24

## 2019-03-18 DIAGNOSIS — B37.0 ORAL THRUSH: ICD-10-CM

## 2019-03-18 RX ORDER — FLUCONAZOLE 100 MG/1
TABLET ORAL
Qty: 8 TABLET | Refills: 0 | OUTPATIENT
Start: 2019-03-18

## 2019-03-18 NOTE — TELEPHONE ENCOUNTER
Medication: Diflucan   Last refill: 01/08/19  Pharmacy: [facility] on [address]  PCP:           Bobbi Mendez MD    Last visit: 10/30/18  Next visit: Visit date not found  Does patient have enough medication for 72 hours: Yes    Controlled Substance Only:     Last Refill: na  Last medication contract documentation:(this must be documented using Rx Monitoring process to be updated)  No data recorded    Last urine drug screen: na  Consistent with medication(s):   Yes  @[employer]([id number])@     [place]AR[place] Review for Controlled medication update  No flowsheet data found. All Future Testing planned in [facility]  Lab Frequency Next Occurrence   POCT Fecal Immunochemical Test (FIT) Once 03/27/2019       All future appointments  No future appointments.     Health Maintenance   Topic Date Due    Pneumococcal 0-64 years at Risk Vaccine (1 of 1 - PPSV23) 06/05/1963    DTaP/Tdap/Td vaccine (1 - Tdap) 06/05/1976    Shingles Vaccine (1 of 2) 06/05/2007    Colon cancer screen colonoscopy  06/05/2007    Potassium monitoring  05/15/2019    Creatinine monitoring  05/15/2019    Lipid screen  03/26/2023    Flu vaccine  Completed    Hepatitis C screen  Completed    HIV screen  Completed       No results found for: LABA1C                                                                  ( goal A1C is < 7)   No results found for: LABMICR  LDL Cholesterol (mg/dL)   Date Value   03/26/2018 70                                                  (goal LDL is <100)   AST (U/L)   Date Value   05/15/2018 37     ALT (U/L)   Date Value   05/15/2018 41     BUN (mg/dL)   Date Value   03/26/2018 14     BP Readings from Last 3 Encounters:   10/30/18 134/80   08/21/18 (!) 144/78   07/30/18 137/80                                                                                     (goal 120/80)      Patient Active Problem List:     Chronic obstructive pulmonary disease ([address]     Smoking     Essential hypertension     Oral thrush Hyperlipidemia     Closed nondisplaced fracture of proximal phalanx of left great toe     Alcohol abuse

## 2019-03-20 ENCOUNTER — OFFICE VISIT (OUTPATIENT)
Dept: FAMILY MEDICINE CLINIC | Age: 64
End: 2019-03-20
Payer: MEDICAID

## 2019-03-20 VITALS
RESPIRATION RATE: 16 BRPM | SYSTOLIC BLOOD PRESSURE: 128 MMHG | WEIGHT: 151 LBS | DIASTOLIC BLOOD PRESSURE: 77 MMHG | HEIGHT: 67 IN | BODY MASS INDEX: 23.7 KG/M2 | HEART RATE: 72 BPM | OXYGEN SATURATION: 98 %

## 2019-03-20 DIAGNOSIS — I10 ESSENTIAL HYPERTENSION: ICD-10-CM

## 2019-03-20 DIAGNOSIS — B37.0 ORAL THRUSH: ICD-10-CM

## 2019-03-20 DIAGNOSIS — J44.9 CHRONIC OBSTRUCTIVE PULMONARY DISEASE, UNSPECIFIED COPD TYPE (HCC): ICD-10-CM

## 2019-03-20 DIAGNOSIS — Z00.00 ROUTINE GENERAL MEDICAL EXAMINATION AT A HEALTH CARE FACILITY: Primary | ICD-10-CM

## 2019-03-20 DIAGNOSIS — Z12.11 SCREEN FOR COLON CANCER: ICD-10-CM

## 2019-03-20 DIAGNOSIS — F10.10 ALCOHOL ABUSE: ICD-10-CM

## 2019-03-20 DIAGNOSIS — F17.200 SMOKING: ICD-10-CM

## 2019-03-20 PROCEDURE — G8484 FLU IMMUNIZE NO ADMIN: HCPCS | Performed by: FAMILY MEDICINE

## 2019-03-20 PROCEDURE — 3017F COLORECTAL CA SCREEN DOC REV: CPT | Performed by: FAMILY MEDICINE

## 2019-03-20 PROCEDURE — G8420 CALC BMI NORM PARAMETERS: HCPCS | Performed by: FAMILY MEDICINE

## 2019-03-20 PROCEDURE — 99213 OFFICE O/P EST LOW 20 MIN: CPT | Performed by: FAMILY MEDICINE

## 2019-03-20 PROCEDURE — 3023F SPIROM DOC REV: CPT | Performed by: FAMILY MEDICINE

## 2019-03-20 PROCEDURE — G8427 DOCREV CUR MEDS BY ELIG CLIN: HCPCS | Performed by: FAMILY MEDICINE

## 2019-03-20 PROCEDURE — 99406 BEHAV CHNG SMOKING 3-10 MIN: CPT | Performed by: FAMILY MEDICINE

## 2019-03-20 PROCEDURE — G8926 SPIRO NO PERF OR DOC: HCPCS | Performed by: FAMILY MEDICINE

## 2019-03-20 PROCEDURE — 4004F PT TOBACCO SCREEN RCVD TLK: CPT | Performed by: FAMILY MEDICINE

## 2019-03-20 PROCEDURE — 99396 PREV VISIT EST AGE 40-64: CPT | Performed by: FAMILY MEDICINE

## 2019-03-20 ASSESSMENT — PATIENT HEALTH QUESTIONNAIRE - PHQ9
SUM OF ALL RESPONSES TO PHQ9 QUESTIONS 1 & 2: 0
SUM OF ALL RESPONSES TO PHQ QUESTIONS 1-9: 0
SUM OF ALL RESPONSES TO PHQ QUESTIONS 1-9: 0
1. LITTLE INTEREST OR PLEASURE IN DOING THINGS: 0
2. FEELING DOWN, DEPRESSED OR HOPELESS: 0

## 2019-03-20 ASSESSMENT — ENCOUNTER SYMPTOMS
BLOOD IN STOOL: 0
SHORTNESS OF BREATH: 0
EYE PAIN: 0

## 2019-03-25 DIAGNOSIS — E78.5 HYPERLIPIDEMIA, UNSPECIFIED HYPERLIPIDEMIA TYPE: ICD-10-CM

## 2019-03-25 RX ORDER — ATORVASTATIN CALCIUM 40 MG/1
TABLET, FILM COATED ORAL
Qty: 90 TABLET | Refills: 3 | Status: SHIPPED | OUTPATIENT
Start: 2019-03-25 | End: 2019-10-14 | Stop reason: SDUPTHER

## 2019-03-29 ENCOUNTER — HOSPITAL ENCOUNTER (OUTPATIENT)
Age: 64
Discharge: HOME | End: 2019-03-29
Payer: MEDICAID

## 2019-03-29 DIAGNOSIS — Z00.00 ROUTINE GENERAL MEDICAL EXAMINATION AT A HEALTH CARE FACILITY: ICD-10-CM

## 2019-03-29 DIAGNOSIS — F10.10 ALCOHOL ABUSE: ICD-10-CM

## 2019-03-29 LAB
ABSOLUTE EOS #: 0.47 K/UL (ref 0–0.44)
ABSOLUTE IMMATURE GRANULOCYTE: 0.07 K/UL (ref 0–0.3)
ABSOLUTE LYMPH #: 2.37 K/UL (ref 1.1–3.7)
ABSOLUTE MONO #: 1.02 K/UL (ref 0.1–1.2)
ALBUMIN SERPL-MCNC: 4.7 G/DL (ref 3.5–5.2)
ALBUMIN/GLOBULIN RATIO: 1.5 (ref 1–2.5)
ALP BLD-CCNC: 147 U/L (ref 40–129)
ALT SERPL-CCNC: 78 U/L (ref 5–41)
ANION GAP SERPL CALCULATED.3IONS-SCNC: 17 MMOL/L (ref 9–17)
AST SERPL-CCNC: 103 U/L
BASOPHILS # BLD: 1 % (ref 0–2)
BASOPHILS ABSOLUTE: 0.1 K/UL (ref 0–0.2)
BILIRUB SERPL-MCNC: 0.9 MG/DL (ref 0.3–1.2)
BUN BLDV-MCNC: 8 MG/DL (ref 8–23)
BUN/CREAT BLD: ABNORMAL (ref 9–20)
CALCIUM SERPL-MCNC: 9.4 MG/DL (ref 8.6–10.4)
CHLORIDE BLD-SCNC: 98 MMOL/L (ref 98–107)
CHOLESTEROL/HDL RATIO: 1.6
CHOLESTEROL: 158 MG/DL
CO2: 22 MMOL/L (ref 20–31)
CREAT SERPL-MCNC: 0.51 MG/DL (ref 0.7–1.2)
DIFFERENTIAL TYPE: ABNORMAL
EOSINOPHILS RELATIVE PERCENT: 4 % (ref 1–4)
GFR AFRICAN AMERICAN: >60 ML/MIN
GFR NON-AFRICAN AMERICAN: >60 ML/MIN
GFR SERPL CREATININE-BSD FRML MDRD: ABNORMAL ML/MIN/{1.73_M2}
GFR SERPL CREATININE-BSD FRML MDRD: ABNORMAL ML/MIN/{1.73_M2}
GGT: 382 U/L (ref 8–61)
GLUCOSE BLD-MCNC: 91 MG/DL (ref 70–99)
HCT VFR BLD CALC: 42.7 % (ref 40.7–50.3)
HDLC SERPL-MCNC: 101 MG/DL
HEMOGLOBIN: 14.1 G/DL (ref 13–17)
IMMATURE GRANULOCYTES: 1 %
LDL CHOLESTEROL: 45 MG/DL (ref 0–130)
LYMPHOCYTES # BLD: 18 % (ref 24–43)
MCH RBC QN AUTO: 31.4 PG (ref 25.2–33.5)
MCHC RBC AUTO-ENTMCNC: 33 G/DL (ref 28.4–34.8)
MCV RBC AUTO: 95.1 FL (ref 82.6–102.9)
MONOCYTES # BLD: 8 % (ref 3–12)
NRBC AUTOMATED: 0 PER 100 WBC
PDW BLD-RTO: 14 % (ref 11.8–14.4)
PLATELET # BLD: 272 K/UL (ref 138–453)
PLATELET ESTIMATE: ABNORMAL
PMV BLD AUTO: 10.1 FL (ref 8.1–13.5)
POTASSIUM SERPL-SCNC: 4.4 MMOL/L (ref 3.7–5.3)
RBC # BLD: 4.49 M/UL (ref 4.21–5.77)
RBC # BLD: ABNORMAL 10*6/UL
SEG NEUTROPHILS: 68 % (ref 36–65)
SEGMENTED NEUTROPHILS ABSOLUTE COUNT: 9.32 K/UL (ref 1.5–8.1)
SODIUM BLD-SCNC: 137 MMOL/L (ref 135–144)
TOTAL PROTEIN: 7.9 G/DL (ref 6.4–8.3)
TRIGL SERPL-MCNC: 58 MG/DL
VLDLC SERPL CALC-MCNC: NORMAL MG/DL (ref 1–30)
WBC # BLD: 13.4 K/UL (ref 3.5–11.3)
WBC # BLD: ABNORMAL 10*3/UL

## 2019-03-29 PROCEDURE — 80061 LIPID PANEL: CPT

## 2019-03-29 PROCEDURE — 82977 ASSAY OF GGT: CPT

## 2019-03-29 PROCEDURE — 80053 COMPREHEN METABOLIC PANEL: CPT

## 2019-03-29 PROCEDURE — 36415 COLL VENOUS BLD VENIPUNCTURE: CPT

## 2019-03-29 PROCEDURE — 85025 COMPLETE CBC W/AUTO DIFF WBC: CPT

## 2019-03-29 PROCEDURE — 83036 HEMOGLOBIN GLYCOSYLATED A1C: CPT

## 2019-03-31 DIAGNOSIS — R79.89 LFT ELEVATION: Primary | ICD-10-CM

## 2019-03-31 DIAGNOSIS — D72.829 LEUKOCYTOSIS, UNSPECIFIED TYPE: ICD-10-CM

## 2019-03-31 LAB
ESTIMATED AVERAGE GLUCOSE: 123 MG/DL
HBA1C MFR BLD: 5.9 % (ref 4–6)

## 2019-09-24 ENCOUNTER — HOSPITAL ENCOUNTER (OUTPATIENT)
Age: 64
Setting detail: SPECIMEN
Discharge: HOME | End: 2019-09-24
Payer: MEDICAID

## 2019-09-24 ENCOUNTER — OFFICE VISIT (OUTPATIENT)
Dept: FAMILY MEDICINE CLINIC | Age: 64
End: 2019-09-24
Payer: MEDICAID

## 2019-09-24 VITALS
HEIGHT: 67 IN | SYSTOLIC BLOOD PRESSURE: 139 MMHG | BODY MASS INDEX: 22.26 KG/M2 | HEART RATE: 99 BPM | OXYGEN SATURATION: 99 % | DIASTOLIC BLOOD PRESSURE: 76 MMHG | WEIGHT: 141.8 LBS

## 2019-09-24 DIAGNOSIS — R79.89 LFT ELEVATION: ICD-10-CM

## 2019-09-24 DIAGNOSIS — I10 ESSENTIAL HYPERTENSION: ICD-10-CM

## 2019-09-24 DIAGNOSIS — F17.200 SMOKING: ICD-10-CM

## 2019-09-24 DIAGNOSIS — F10.10 ALCOHOL ABUSE: Primary | ICD-10-CM

## 2019-09-24 DIAGNOSIS — D72.829 LEUKOCYTOSIS, UNSPECIFIED TYPE: ICD-10-CM

## 2019-09-24 DIAGNOSIS — S30.1XXA ABDOMINAL WALL HEMATOMA, INITIAL ENCOUNTER: ICD-10-CM

## 2019-09-24 LAB
ABSOLUTE EOS #: 0.24 K/UL (ref 0–0.44)
ABSOLUTE IMMATURE GRANULOCYTE: 0.15 K/UL (ref 0–0.3)
ABSOLUTE LYMPH #: 1.97 K/UL (ref 1.1–3.7)
ABSOLUTE MONO #: 1.43 K/UL (ref 0.1–1.2)
ALBUMIN SERPL-MCNC: 4.5 G/DL (ref 3.5–5.2)
ALBUMIN/GLOBULIN RATIO: 1.2 (ref 1–2.5)
ALP BLD-CCNC: 256 U/L (ref 40–129)
ALT SERPL-CCNC: 51 U/L (ref 5–41)
AST SERPL-CCNC: 83 U/L
BASOPHILS # BLD: 1 % (ref 0–2)
BASOPHILS ABSOLUTE: 0.14 K/UL (ref 0–0.2)
BILIRUB SERPL-MCNC: 1.01 MG/DL (ref 0.3–1.2)
BILIRUBIN DIRECT: 0.32 MG/DL
BILIRUBIN, INDIRECT: 0.69 MG/DL (ref 0–1)
DIFFERENTIAL TYPE: ABNORMAL
EOSINOPHILS RELATIVE PERCENT: 1 % (ref 1–4)
GGT: 663 U/L (ref 8–61)
GLOBULIN: ABNORMAL G/DL (ref 1.5–3.8)
HCT VFR BLD CALC: 41.7 % (ref 40.7–50.3)
HEMOGLOBIN: 13.4 G/DL (ref 13–17)
IMMATURE GRANULOCYTES: 1 %
LYMPHOCYTES # BLD: 11 % (ref 24–43)
MCH RBC QN AUTO: 30.4 PG (ref 25.2–33.5)
MCHC RBC AUTO-ENTMCNC: 32.1 G/DL (ref 28.4–34.8)
MCV RBC AUTO: 94.6 FL (ref 82.6–102.9)
MONOCYTES # BLD: 8 % (ref 3–12)
NRBC AUTOMATED: 0 PER 100 WBC
PDW BLD-RTO: 14.6 % (ref 11.8–14.4)
PLATELET # BLD: 344 K/UL (ref 138–453)
PLATELET ESTIMATE: ABNORMAL
PMV BLD AUTO: 10 FL (ref 8.1–13.5)
RBC # BLD: 4.41 M/UL (ref 4.21–5.77)
RBC # BLD: ABNORMAL 10*6/UL
SEG NEUTROPHILS: 78 % (ref 36–65)
SEGMENTED NEUTROPHILS ABSOLUTE COUNT: 13.58 K/UL (ref 1.5–8.1)
TOTAL PROTEIN: 8.2 G/DL (ref 6.4–8.3)
WBC # BLD: 17.5 K/UL (ref 3.5–11.3)
WBC # BLD: ABNORMAL 10*3/UL

## 2019-09-24 PROCEDURE — 3017F COLORECTAL CA SCREEN DOC REV: CPT | Performed by: FAMILY MEDICINE

## 2019-09-24 PROCEDURE — 4004F PT TOBACCO SCREEN RCVD TLK: CPT | Performed by: FAMILY MEDICINE

## 2019-09-24 PROCEDURE — G8420 CALC BMI NORM PARAMETERS: HCPCS | Performed by: FAMILY MEDICINE

## 2019-09-24 PROCEDURE — G8428 CUR MEDS NOT DOCUMENT: HCPCS | Performed by: FAMILY MEDICINE

## 2019-09-24 PROCEDURE — 99214 OFFICE O/P EST MOD 30 MIN: CPT | Performed by: FAMILY MEDICINE

## 2019-09-24 PROCEDURE — 99406 BEHAV CHNG SMOKING 3-10 MIN: CPT | Performed by: FAMILY MEDICINE

## 2019-09-24 RX ORDER — DISULFIRAM 250 MG/1
250 TABLET ORAL DAILY
Qty: 30 TABLET | Refills: 2 | Status: SHIPPED | OUTPATIENT
Start: 2019-09-24 | End: 2019-11-25

## 2019-09-24 RX ORDER — AMLODIPINE BESYLATE 10 MG/1
10 TABLET ORAL DAILY
Qty: 90 TABLET | Refills: 3 | Status: SHIPPED | OUTPATIENT
Start: 2019-09-24 | End: 2019-10-14 | Stop reason: SDUPTHER

## 2019-09-24 RX ORDER — DISULFIRAM 500 MG/1
TABLET ORAL
Qty: 14 TABLET | Refills: 0 | Status: SHIPPED | OUTPATIENT
Start: 2019-09-24 | End: 2019-11-25

## 2019-09-24 ASSESSMENT — ENCOUNTER SYMPTOMS
SHORTNESS OF BREATH: 0
COLOR CHANGE: 1
BLOOD IN STOOL: 0

## 2019-10-14 DIAGNOSIS — E78.5 HYPERLIPIDEMIA, UNSPECIFIED HYPERLIPIDEMIA TYPE: ICD-10-CM

## 2019-10-14 DIAGNOSIS — I10 ESSENTIAL HYPERTENSION: ICD-10-CM

## 2019-10-14 RX ORDER — AMLODIPINE BESYLATE 10 MG/1
10 TABLET ORAL DAILY
Qty: 90 TABLET | Refills: 3 | Status: SHIPPED | OUTPATIENT
Start: 2019-10-14 | End: 2021-03-15

## 2019-10-14 RX ORDER — ATORVASTATIN CALCIUM 40 MG/1
TABLET, FILM COATED ORAL
Qty: 90 TABLET | Refills: 3 | Status: SHIPPED | OUTPATIENT
Start: 2019-10-14 | End: 2021-08-23

## 2019-11-25 ENCOUNTER — OFFICE VISIT (OUTPATIENT)
Dept: FAMILY MEDICINE CLINIC | Age: 64
End: 2019-11-25
Payer: MEDICAID

## 2019-11-25 ENCOUNTER — HOSPITAL ENCOUNTER (OUTPATIENT)
Age: 64
Setting detail: SPECIMEN
Discharge: HOME | End: 2019-11-25
Payer: MEDICAID

## 2019-11-25 VITALS
SYSTOLIC BLOOD PRESSURE: 128 MMHG | HEART RATE: 72 BPM | TEMPERATURE: 96.9 F | BODY MASS INDEX: 23.49 KG/M2 | WEIGHT: 150 LBS | OXYGEN SATURATION: 99 % | DIASTOLIC BLOOD PRESSURE: 77 MMHG

## 2019-11-25 DIAGNOSIS — F10.10 ALCOHOL ABUSE: Primary | ICD-10-CM

## 2019-11-25 DIAGNOSIS — F10.10 ALCOHOL ABUSE: ICD-10-CM

## 2019-11-25 DIAGNOSIS — R79.89 LFT ELEVATION: ICD-10-CM

## 2019-11-25 DIAGNOSIS — D72.829 LEUKOCYTOSIS, UNSPECIFIED TYPE: ICD-10-CM

## 2019-11-25 DIAGNOSIS — Z87.891 PERSONAL HISTORY OF TOBACCO USE: ICD-10-CM

## 2019-11-25 DIAGNOSIS — I10 ESSENTIAL HYPERTENSION: ICD-10-CM

## 2019-11-25 DIAGNOSIS — F17.200 SMOKING: ICD-10-CM

## 2019-11-25 LAB
ABSOLUTE EOS #: 0.38 K/UL (ref 0–0.44)
ABSOLUTE IMMATURE GRANULOCYTE: 0.08 K/UL (ref 0–0.3)
ABSOLUTE LYMPH #: 1.88 K/UL (ref 1.1–3.7)
ABSOLUTE MONO #: 0.97 K/UL (ref 0.1–1.2)
ALBUMIN SERPL-MCNC: 4.2 G/DL (ref 3.5–5.2)
ALBUMIN/GLOBULIN RATIO: 1.2 (ref 1–2.5)
ALP BLD-CCNC: 218 U/L (ref 40–129)
ALT SERPL-CCNC: 98 U/L (ref 5–41)
AST SERPL-CCNC: 103 U/L
BASOPHILS # BLD: 1 % (ref 0–2)
BASOPHILS ABSOLUTE: 0.1 K/UL (ref 0–0.2)
BILIRUB SERPL-MCNC: 0.59 MG/DL (ref 0.3–1.2)
BILIRUBIN DIRECT: 0.18 MG/DL
BILIRUBIN, INDIRECT: 0.41 MG/DL (ref 0–1)
DIFFERENTIAL TYPE: ABNORMAL
EOSINOPHILS RELATIVE PERCENT: 3 % (ref 1–4)
GGT: 599 U/L (ref 8–61)
GLOBULIN: ABNORMAL G/DL (ref 1.5–3.8)
HCT VFR BLD CALC: 42.7 % (ref 40.7–50.3)
HEMOGLOBIN: 13.4 G/DL (ref 13–17)
IMMATURE GRANULOCYTES: 1 %
LYMPHOCYTES # BLD: 15 % (ref 24–43)
MCH RBC QN AUTO: 30 PG (ref 25.2–33.5)
MCHC RBC AUTO-ENTMCNC: 31.4 G/DL (ref 28.4–34.8)
MCV RBC AUTO: 95.7 FL (ref 82.6–102.9)
MONOCYTES # BLD: 8 % (ref 3–12)
NRBC AUTOMATED: 0 PER 100 WBC
PDW BLD-RTO: 13.8 % (ref 11.8–14.4)
PLATELET # BLD: 387 K/UL (ref 138–453)
PLATELET ESTIMATE: ABNORMAL
PMV BLD AUTO: 9.9 FL (ref 8.1–13.5)
RBC # BLD: 4.46 M/UL (ref 4.21–5.77)
RBC # BLD: ABNORMAL 10*6/UL
SEG NEUTROPHILS: 72 % (ref 36–65)
SEGMENTED NEUTROPHILS ABSOLUTE COUNT: 9.21 K/UL (ref 1.5–8.1)
TOTAL PROTEIN: 7.8 G/DL (ref 6.4–8.3)
WBC # BLD: 12.6 K/UL (ref 3.5–11.3)
WBC # BLD: ABNORMAL 10*3/UL

## 2019-11-25 PROCEDURE — G0296 VISIT TO DETERM LDCT ELIG: HCPCS | Performed by: FAMILY MEDICINE

## 2019-11-25 PROCEDURE — 4004F PT TOBACCO SCREEN RCVD TLK: CPT | Performed by: FAMILY MEDICINE

## 2019-11-25 PROCEDURE — 99214 OFFICE O/P EST MOD 30 MIN: CPT | Performed by: FAMILY MEDICINE

## 2019-11-25 PROCEDURE — G8482 FLU IMMUNIZE ORDER/ADMIN: HCPCS | Performed by: FAMILY MEDICINE

## 2019-11-25 PROCEDURE — 99406 BEHAV CHNG SMOKING 3-10 MIN: CPT | Performed by: FAMILY MEDICINE

## 2019-11-25 PROCEDURE — G8420 CALC BMI NORM PARAMETERS: HCPCS | Performed by: FAMILY MEDICINE

## 2019-11-25 PROCEDURE — G8427 DOCREV CUR MEDS BY ELIG CLIN: HCPCS | Performed by: FAMILY MEDICINE

## 2019-11-25 PROCEDURE — 3017F COLORECTAL CA SCREEN DOC REV: CPT | Performed by: FAMILY MEDICINE

## 2019-11-25 ASSESSMENT — ENCOUNTER SYMPTOMS
EYE PAIN: 0
BLOOD IN STOOL: 0
SHORTNESS OF BREATH: 0

## 2020-01-06 ENCOUNTER — TELEPHONE (OUTPATIENT)
Dept: FAMILY MEDICINE CLINIC | Age: 65
End: 2020-01-06

## 2020-01-06 NOTE — TELEPHONE ENCOUNTER
Pt states he is having a the mouth issue he was having at 3/20/19 visit and requests a refill of magic mouthwash.

## 2020-02-24 ENCOUNTER — HOSPITAL ENCOUNTER (OUTPATIENT)
Age: 65
Setting detail: SPECIMEN
Discharge: HOME | End: 2020-02-24
Payer: MEDICAID

## 2020-02-24 ENCOUNTER — OFFICE VISIT (OUTPATIENT)
Dept: FAMILY MEDICINE CLINIC | Age: 65
End: 2020-02-24
Payer: MEDICAID

## 2020-02-24 VITALS
HEART RATE: 75 BPM | WEIGHT: 152.2 LBS | TEMPERATURE: 97.5 F | SYSTOLIC BLOOD PRESSURE: 139 MMHG | OXYGEN SATURATION: 98 % | BODY MASS INDEX: 23.84 KG/M2 | DIASTOLIC BLOOD PRESSURE: 78 MMHG

## 2020-02-24 PROBLEM — H93.13 TINNITUS OF BOTH EARS: Status: ACTIVE | Noted: 2020-02-24

## 2020-02-24 LAB
ABSOLUTE EOS #: 0.32 K/UL (ref 0–0.44)
ABSOLUTE IMMATURE GRANULOCYTE: 0.06 K/UL (ref 0–0.3)
ABSOLUTE LYMPH #: 1.48 K/UL (ref 1.1–3.7)
ABSOLUTE MONO #: 1.15 K/UL (ref 0.1–1.2)
ALBUMIN SERPL-MCNC: 4.5 G/DL (ref 3.5–5.2)
ALBUMIN/GLOBULIN RATIO: 1.3 (ref 1–2.5)
ALP BLD-CCNC: 266 U/L (ref 40–129)
ALT SERPL-CCNC: 58 U/L (ref 5–41)
ANION GAP SERPL CALCULATED.3IONS-SCNC: 25 MMOL/L (ref 9–17)
AST SERPL-CCNC: 97 U/L
BASOPHILS # BLD: 1 % (ref 0–2)
BASOPHILS ABSOLUTE: 0.1 K/UL (ref 0–0.2)
BILIRUB SERPL-MCNC: 1.32 MG/DL (ref 0.3–1.2)
BUN BLDV-MCNC: 10 MG/DL (ref 8–23)
BUN/CREAT BLD: ABNORMAL (ref 9–20)
CALCIUM SERPL-MCNC: 9.9 MG/DL (ref 8.6–10.4)
CHLORIDE BLD-SCNC: 92 MMOL/L (ref 98–107)
CHOLESTEROL/HDL RATIO: 1.5
CHOLESTEROL: 145 MG/DL
CO2: 20 MMOL/L (ref 20–31)
CREAT SERPL-MCNC: 0.61 MG/DL (ref 0.7–1.2)
DIFFERENTIAL TYPE: ABNORMAL
EOSINOPHILS RELATIVE PERCENT: 3 % (ref 1–4)
ESTIMATED AVERAGE GLUCOSE: 126 MG/DL
GFR AFRICAN AMERICAN: >60 ML/MIN
GFR NON-AFRICAN AMERICAN: >60 ML/MIN
GFR SERPL CREATININE-BSD FRML MDRD: ABNORMAL ML/MIN/{1.73_M2}
GFR SERPL CREATININE-BSD FRML MDRD: ABNORMAL ML/MIN/{1.73_M2}
GGT: 845 U/L (ref 8–61)
GLUCOSE BLD-MCNC: 66 MG/DL (ref 70–99)
HBA1C MFR BLD: 6 % (ref 4–6)
HCT VFR BLD CALC: 42.5 % (ref 40.7–50.3)
HDLC SERPL-MCNC: 94 MG/DL
HEMOGLOBIN: 13.5 G/DL (ref 13–17)
IMMATURE GRANULOCYTES: 1 %
LDL CHOLESTEROL: 37 MG/DL (ref 0–130)
LYMPHOCYTES # BLD: 12 % (ref 24–43)
MCH RBC QN AUTO: 30.5 PG (ref 25.2–33.5)
MCHC RBC AUTO-ENTMCNC: 31.8 G/DL (ref 28.4–34.8)
MCV RBC AUTO: 95.9 FL (ref 82.6–102.9)
MONOCYTES # BLD: 9 % (ref 3–12)
NRBC AUTOMATED: 0 PER 100 WBC
PDW BLD-RTO: 15.9 % (ref 11.8–14.4)
PLATELET # BLD: 286 K/UL (ref 138–453)
PLATELET ESTIMATE: ABNORMAL
PMV BLD AUTO: 10.2 FL (ref 8.1–13.5)
POTASSIUM SERPL-SCNC: 4 MMOL/L (ref 3.7–5.3)
RBC # BLD: 4.43 M/UL (ref 4.21–5.77)
RBC # BLD: ABNORMAL 10*6/UL
SEG NEUTROPHILS: 74 % (ref 36–65)
SEGMENTED NEUTROPHILS ABSOLUTE COUNT: 9.57 K/UL (ref 1.5–8.1)
SODIUM BLD-SCNC: 137 MMOL/L (ref 135–144)
TOTAL PROTEIN: 7.9 G/DL (ref 6.4–8.3)
TRIGL SERPL-MCNC: 69 MG/DL
VLDLC SERPL CALC-MCNC: NORMAL MG/DL (ref 1–30)
WBC # BLD: 12.7 K/UL (ref 3.5–11.3)
WBC # BLD: ABNORMAL 10*3/UL

## 2020-02-24 PROCEDURE — 3017F COLORECTAL CA SCREEN DOC REV: CPT | Performed by: FAMILY MEDICINE

## 2020-02-24 PROCEDURE — G8482 FLU IMMUNIZE ORDER/ADMIN: HCPCS | Performed by: FAMILY MEDICINE

## 2020-02-24 PROCEDURE — 4004F PT TOBACCO SCREEN RCVD TLK: CPT | Performed by: FAMILY MEDICINE

## 2020-02-24 PROCEDURE — G8427 DOCREV CUR MEDS BY ELIG CLIN: HCPCS | Performed by: FAMILY MEDICINE

## 2020-02-24 PROCEDURE — 99213 OFFICE O/P EST LOW 20 MIN: CPT | Performed by: FAMILY MEDICINE

## 2020-02-24 PROCEDURE — G8420 CALC BMI NORM PARAMETERS: HCPCS | Performed by: FAMILY MEDICINE

## 2020-02-24 PROCEDURE — 99396 PREV VISIT EST AGE 40-64: CPT | Performed by: FAMILY MEDICINE

## 2020-02-24 PROCEDURE — 99406 BEHAV CHNG SMOKING 3-10 MIN: CPT | Performed by: FAMILY MEDICINE

## 2020-02-24 RX ORDER — THIAMINE MONONITRATE (VIT B1) 100 MG
100 TABLET ORAL DAILY
Qty: 30 TABLET | Refills: 3 | Status: SHIPPED | OUTPATIENT
Start: 2020-02-24 | End: 2021-08-23

## 2020-02-24 RX ORDER — FOLIC ACID 1 MG/1
1 TABLET ORAL DAILY
Qty: 30 TABLET | Refills: 3 | Status: SHIPPED | OUTPATIENT
Start: 2020-02-24 | End: 2021-08-23

## 2020-02-24 ASSESSMENT — PATIENT HEALTH QUESTIONNAIRE - PHQ9
1. LITTLE INTEREST OR PLEASURE IN DOING THINGS: 0
SUM OF ALL RESPONSES TO PHQ QUESTIONS 1-9: 0
SUM OF ALL RESPONSES TO PHQ9 QUESTIONS 1 & 2: 0
SUM OF ALL RESPONSES TO PHQ QUESTIONS 1-9: 0
2. FEELING DOWN, DEPRESSED OR HOPELESS: 0

## 2020-02-24 ASSESSMENT — ENCOUNTER SYMPTOMS
EYE PAIN: 0
BLOOD IN STOOL: 0
SHORTNESS OF BREATH: 0

## 2020-02-24 NOTE — PROGRESS NOTES
options, side effects, and behavioral counseling. Reviewed chantix, buproprion, nicotine replacement. Behavioral modifications - don't smoke in the house or in the car. Stay active. Find support in family members and friends. Not ready to quit right now  Follow up to review smoking cessation.  - [place] TOBACCO USE CESSATION INTERMEDIATE 3-10 MINUTES    6. Hyperlipidemia, unspecified hyperlipidemia type  - Lipid Panel; Future    7. Leukocytosis, unspecified type  - CBC Auto Differential; Future    8. Screen for colon cancer  Reminder given to complete Cologuard    9. Tinnitus of both ears  - [facility] - Jaye Tipton MD, Otolaryngology, [place]    Recommended establishing with new PCP within 3 months as I will be leaving the practice      Call or return to clinic prn if these symptoms worsen or fail to improve as anticipated. I have reviewed the instructions with the patient, answering all questions to their satisfaction.     Electronically signed by Vickie Boyd MD on 2/24/2020 at 11:31 AM

## 2020-02-26 ENCOUNTER — TELEPHONE (OUTPATIENT)
Dept: ONCOLOGY | Age: 65
End: 2020-02-26

## 2020-06-10 ENCOUNTER — OFFICE VISIT (OUTPATIENT)
Dept: FAMILY MEDICINE CLINIC | Age: 65
End: 2020-06-10
Payer: MEDICAID

## 2020-06-10 ENCOUNTER — HOSPITAL ENCOUNTER (OUTPATIENT)
Age: 65
Setting detail: SPECIMEN
Discharge: HOME | End: 2020-06-10
Payer: MEDICAID

## 2020-06-10 VITALS
SYSTOLIC BLOOD PRESSURE: 110 MMHG | BODY MASS INDEX: 20.92 KG/M2 | HEART RATE: 80 BPM | WEIGHT: 138 LBS | DIASTOLIC BLOOD PRESSURE: 69 MMHG | HEIGHT: 68 IN | OXYGEN SATURATION: 98 %

## 2020-06-10 LAB
ALBUMIN SERPL-MCNC: 4.3 G/DL (ref 3.5–5.2)
ALBUMIN/GLOBULIN RATIO: 1.2 (ref 1–2.5)
ALP BLD-CCNC: 343 U/L (ref 40–129)
ALT SERPL-CCNC: 21 U/L (ref 5–41)
ANION GAP SERPL CALCULATED.3IONS-SCNC: 17 MMOL/L (ref 9–17)
AST SERPL-CCNC: 47 U/L
BILIRUB SERPL-MCNC: 1.15 MG/DL (ref 0.3–1.2)
BUN BLDV-MCNC: 7 MG/DL (ref 8–23)
BUN/CREAT BLD: ABNORMAL (ref 9–20)
CALCIUM SERPL-MCNC: 9.3 MG/DL (ref 8.6–10.4)
CHLORIDE BLD-SCNC: 89 MMOL/L (ref 98–107)
CO2: 22 MMOL/L (ref 20–31)
CREAT SERPL-MCNC: 0.87 MG/DL (ref 0.7–1.2)
GFR AFRICAN AMERICAN: >60 ML/MIN
GFR NON-AFRICAN AMERICAN: >60 ML/MIN
GFR SERPL CREATININE-BSD FRML MDRD: ABNORMAL ML/MIN/{1.73_M2}
GFR SERPL CREATININE-BSD FRML MDRD: ABNORMAL ML/MIN/{1.73_M2}
GGT: 716 U/L (ref 8–61)
GLUCOSE BLD-MCNC: 97 MG/DL (ref 70–99)
POTASSIUM SERPL-SCNC: 4.5 MMOL/L (ref 3.7–5.3)
SODIUM BLD-SCNC: 128 MMOL/L (ref 135–144)
TOTAL PROTEIN: 7.8 G/DL (ref 6.4–8.3)

## 2020-06-10 PROCEDURE — 3017F COLORECTAL CA SCREEN DOC REV: CPT | Performed by: FAMILY MEDICINE

## 2020-06-10 PROCEDURE — 4004F PT TOBACCO SCREEN RCVD TLK: CPT | Performed by: FAMILY MEDICINE

## 2020-06-10 PROCEDURE — G8427 DOCREV CUR MEDS BY ELIG CLIN: HCPCS | Performed by: FAMILY MEDICINE

## 2020-06-10 PROCEDURE — 99214 OFFICE O/P EST MOD 30 MIN: CPT | Performed by: FAMILY MEDICINE

## 2020-06-10 PROCEDURE — G8420 CALC BMI NORM PARAMETERS: HCPCS | Performed by: FAMILY MEDICINE

## 2020-06-10 ASSESSMENT — PATIENT HEALTH QUESTIONNAIRE - PHQ9
SUM OF ALL RESPONSES TO PHQ QUESTIONS 1-9: 0
SUM OF ALL RESPONSES TO PHQ9 QUESTIONS 1 & 2: 0
SUM OF ALL RESPONSES TO PHQ QUESTIONS 1-9: 0
2. FEELING DOWN, DEPRESSED OR HOPELESS: 0
1. LITTLE INTEREST OR PLEASURE IN DOING THINGS: 0

## 2020-06-10 NOTE — PROGRESS NOTES
02/24/2020 0.61 (L)     Glucose (mg/dL)   Date Value   02/24/2020 66 (L)     Hemoglobin A1C (%)   Date Value   02/24/2020 6.0              Subjective:      HPI  He is being seen today as a new patient to establish care he has a history of hypertension elevated liver enzymes from alcohol abuse hyperlipidemia he states he is having no new problems as of today unfortunately has not quit drinking    Review of Systems:     Constitutional: Negative for fever, appetite change and fatigue. Family social and medical history reviewed and unchanged     HENT: Negative. Negative for nosebleeds, trouble swallowing and neck pain. Eyes: Negative for photophobia and visual disturbance. Respiratory: Negative. Negative for chest tightness and shortness of breath. Cardiovascular: Negative. Negative for chest pain and leg swelling. Gastrointestinal: Negative. Negative for abdominal pain and blood in stool. Endocrine: Negative for cold intolerance and polyuria. Genitourinary: Negative for dysuria and hematuria. Musculoskeletal: Negative. Skin: Negative for rash. Allergic/Immunologic: Negative. Neurological: Negative. Negative for dizziness, weakness and numbness. Hematological: Negative. Negative for adenopathy. Does not bruise/bleed easily. Psychiatric/Behavioral: Negative for sleep disturbance, dysphoric mood and  decreased concentration. The patient is not nervous/anxious. Objective:     Physical Exam:     Nursing note and vitals reviewed. /69   Pulse 80   Ht 5' 8\" (1.727 m)   Wt 138 lb (62.6 kg)   SpO2 98%   BMI 20.98 kg/m²   Constitutional: He is oriented to person, place, and time. He   appears well-developed and well-nourished. HENT:   Head: Normocephalic and atraumatic. Right Ear: External ear normal. Tympanic membrane is not erythematous. No middle ear effusion. Left Ear: External ear normal. Tympanic membrane is not erythematous. No middle ear effusion. Nose: No mucosal edema. Mouth/Throat: Oropharynx is clear and moist. No posterior oropharyngeal erythema. Eyes: Conjunctivae and EOM are normal. Pupils are equal, round, and reactive to light. Neck: Normal range of motion. Neck supple. No thyromegaly present. Cardiovascular: Normal rate, regular rhythm and normal heart sounds. No murmur heard. Pulmonary/Chest: Effort normal and breath sounds normal. He has no wheezes. Hehas no rales. Abdominal: Soft. Bowel sounds are normal. He exhibits no distension and no mass. There is no tenderness. There is no rebound and no guarding. Genitourinary/Anorectal:deferred  Musculoskeletal: Normal range of motion. He exhibits no edema or tenderness. Lymphadenopathy: He has no cervical adenopathy. Neurological: He is alert and oriented to person, place, and time. He has normal reflexes. Skin: Skin is warm and dry. No rash noted. Psychiatric: He has a normal mood and affect. His   behavior is normal.       Assessment:      1. Establishing care with new doctor, encounter for    2. Alcohol abuse    3. LFT elevation    4. Essential hypertension    5. Hyperlipidemia, unspecified hyperlipidemia type          Plan:      Call or return to clinic prn if these symptoms worsen or fail to improve as anticipated. I have reviewed the instructions with the patient, answering all questions to his satisfaction. Return in about 6 months (around 12/10/2020), or if symptoms worsen or fail to improve, for htn. Orders Placed This Encounter   Procedures    Comprehensive Metabolic Panel     Standing Status:   Future     Standing Expiration Date:   12/10/2020    Gamma GT     Standing Status:   Future     Standing Expiration Date:   12/10/2020     No orders of the defined types were placed in this encounter.   Recheck laboratory studies I strongly encourage patient that he must quit drinking or this will be his demise    Electronically signed by Ino Garnett DO on 6/10/2020 at 2:01 PM

## 2020-08-08 ENCOUNTER — APPOINTMENT (OUTPATIENT)
Dept: CT IMAGING | Age: 65
End: 2020-08-08
Payer: MEDICAID

## 2020-08-08 ENCOUNTER — HOSPITAL ENCOUNTER (EMERGENCY)
Age: 65
Discharge: HOME | End: 2020-08-09
Attending: EMERGENCY MEDICINE
Payer: MEDICAID

## 2020-08-08 VITALS
RESPIRATION RATE: 20 BRPM | SYSTOLIC BLOOD PRESSURE: 125 MMHG | OXYGEN SATURATION: 98 % | HEART RATE: 84 BPM | WEIGHT: 140.6 LBS | TEMPERATURE: 98.4 F | BODY MASS INDEX: 21.31 KG/M2 | HEIGHT: 68 IN | DIASTOLIC BLOOD PRESSURE: 70 MMHG

## 2020-08-08 PROCEDURE — 70450 CT HEAD/BRAIN W/O DYE: CPT

## 2020-08-08 PROCEDURE — 72125 CT NECK SPINE W/O DYE: CPT

## 2020-08-08 PROCEDURE — 12002 RPR S/N/AX/GEN/TRNK2.6-7.5CM: CPT

## 2020-08-08 PROCEDURE — 2500000003 HC RX 250 WO HCPCS: Performed by: NURSE PRACTITIONER

## 2020-08-08 PROCEDURE — 99283 EMERGENCY DEPT VISIT LOW MDM: CPT

## 2020-08-08 RX ORDER — LIDOCAINE HYDROCHLORIDE 10 MG/ML
20 INJECTION, SOLUTION INFILTRATION; PERINEURAL ONCE
Status: COMPLETED | OUTPATIENT
Start: 2020-08-08 | End: 2020-08-08

## 2020-08-08 RX ADMIN — LIDOCAINE HYDROCHLORIDE 20 ML: 10 INJECTION, SOLUTION INFILTRATION; PERINEURAL at 23:31

## 2020-08-08 ASSESSMENT — PAIN DESCRIPTION - LOCATION: LOCATION: HEAD

## 2020-08-08 ASSESSMENT — PAIN SCALES - GENERAL
PAINLEVEL_OUTOF10: 2
PAINLEVEL_OUTOF10: 2

## 2020-08-08 ASSESSMENT — PAIN DESCRIPTION - PAIN TYPE: TYPE: ACUTE PAIN

## 2020-08-09 ASSESSMENT — ENCOUNTER SYMPTOMS
RHINORRHEA: 0
SHORTNESS OF BREATH: 0
DIARRHEA: 0
SORE THROAT: 0
VOMITING: 0
SINUS PRESSURE: 0
COUGH: 0
COLOR CHANGE: 0
WHEEZING: 0
NAUSEA: 0
ABDOMINAL PAIN: 0
CONSTIPATION: 0

## 2020-08-09 NOTE — ED PROVIDER NOTES
[address] [place] [address] ED  eMERGENCY dEPARTMENT eNCOUnter      Pt Name: Lilly Rutledge  MRN: [id number]  [place] 1955  Date of evaluation: 8/8/2020  Provider: Jimmie Vila MD    [address] [place] [address]       Chief Complaint   Patient presents with    Head Laceration     fell against glass picture    Extremity Laceration     left         HISTORY OF PRESENT ILLNESS  (Location/Symptom, Timing/Onset, Context/Setting, Quality, Duration, Modifying Factors, Severity.)   Lilly Rutledge is a 63 y.o. male who presents to the emergency department by private vehicle for evaluation of a laceration to the top of his head. Patient states that he had been drinking some alcohol. He states that he stumbled and fell against a glass picture that broke lacerating the top of his head. He also has a small abrasion noted to the left arm. He states that his last tetanus vaccination was less than 10 years ago. He denies any loss of consciousness, nausea or vomiting but he does admit to drinking alcohol. Nursing Notes were reviewed. ALLERGIES     Patient has no known allergies. CURRENT MEDICATIONS       Discharge Medication List as of 8/8/2020 11:56 PM      CONTINUE these medications which have NOT CHANGED    Details   vitamin B-1 (THIAMINE) 100 MG tablet Take 1 tablet by mouth daily, Disp-30 tablet, [id number]      folic acid (FOLVITE) 1 MG tablet Take 1 tablet by mouth daily, Disp-30 tablet, R-3Normal      amLODIPine (NORVASC) 10 MG tablet Take 1 tablet by mouth daily, Disp-90 tablet, R-3Normal      atorvastatin (LIPITOR) 40 MG tablet take 1 tablet by mouth once daily, Disp-90 tablet, R-3Normal             PAST MEDICAL HISTORY         Diagnosis Date    COPD (chronic obstructive pulmonary disease) ([facility] [address]        SURGICAL HISTORY           Procedure Laterality Date    JOINT REPLACEMENT      lt hip         FAMILY HISTORY     History reviewed. No pertinent family history. No family status information on file.         SOCIAL HISTORY reports that he has been smoking cigarettes. He has a 15.00 pack-year smoking history. He has never used smokeless tobacco. He reports current alcohol use of about 8.0 standard drinks of alcohol per week. He reports that he does not use drugs. REVIEW OF SYSTEMS    (2-9 systems for level 4, 10 or more for level 5)     Review of Systems   Constitutional: Negative for chills, fever and unexpected weight change. HENT: Negative for congestion, rhinorrhea, sinus pressure and sore throat. Respiratory: Negative for cough, shortness of breath and wheezing. Cardiovascular: Negative for chest pain and palpitations. Gastrointestinal: Negative for abdominal pain, constipation, diarrhea, nausea and vomiting. Genitourinary: Negative for dysuria and hematuria. Musculoskeletal: Negative for arthralgias and myalgias. Skin: Positive for wound. Negative for color change and rash. Neurological: Negative for dizziness, weakness and headaches. Hematological: Negative for adenopathy. Except as noted above the remainder of the review of systems was reviewed and negative. PHYSICAL EXAM    (up to 7 for level 4, 8 or more for level 5)     ED Triage Vitals [08/08/20 2249]   BP Temp Temp Source Pulse Resp SpO2 Height Weight   125/70 98.4 °F (36.9 °C) Oral 84 20 98 % 5' 7.5\" (1.715 m) 140 lb 9.6 oz (63.8 kg)       Physical Exam  Vitals signs reviewed. Constitutional:       Appearance: He is well-developed. HENT:      Head: Normocephalic and atraumatic. Eyes:      Conjunctiva/sclera: Conjunctivae normal.      Pupils: Pupils are equal, round, and reactive to light. Neck:      Musculoskeletal: Normal range of motion and neck supple. Cardiovascular:      Rate and Rhythm: Normal rate and regular rhythm. Pulmonary:      Effort: Pulmonary effort is normal. No respiratory distress. Breath sounds: Normal breath sounds. No stridor.    Abdominal:      General: Bowel sounds are normal.      Palpations: Abdomen is soft. Musculoskeletal: Normal range of motion. Lymphadenopathy:      Cervical: No cervical adenopathy. Skin:     General: Skin is warm and dry. Findings: No rash. Neurological:      Mental Status: He is alert and oriented to person, place, and time. RADIOLOGY:   Non-plain film images such as CT, Ultrasound and MRI are read by the radiologist. Katrin Hemphill radiographic images are visualized and preliminarily interpreted by the emergency physician with the below findings:    Ct Head Wo Contrast    Result Date: 8/8/2020  EXAMINATION: CT OF THE HEAD WITHOUT CONTRAST  8/8/2020 11:05 pm TECHNIQUE: CT of the head was performed without the administration of intravenous contrast. Dose modulation, iterative reconstruction, and/or weight based adjustment of the mA/kV was utilized to reduce the radiation dose to as low as reasonably achievable. COMPARISON: None. HISTORY: ORDERING SYSTEM PROVIDED HISTORY: Head injury, positive ETOH TECHNOLOGIST PROVIDED HISTORY: hea dinjury, positive ETOH Reason for Exam: fall, hit head, head laceration Acuity: Acute Type of Exam: Initial Mechanism of Injury: fall FINDINGS: BRAIN/VENTRICLES: There is no acute intracranial hemorrhage, mass effect or midline shift. No abnormal extra-axial fluid collection. The gray-white differentiation is maintained without evidence of an acute infarct. There is no evidence of hydrocephalus. There is prominence of the ventricles and sulci due to global parenchymal volume loss. ORBITS: The visualized portion of the orbits demonstrate no acute abnormality. SINUSES: The visualized paranasal sinuses and mastoid air cells demonstrate no acute abnormality. SOFT TISSUES/SKULL:  No acute abnormality of the visualized skull or soft tissues. No acute intracranial abnormality.      Ct Cervical Spine Wo Contrast    Result Date: 8/8/2020  EXAMINATION: CT OF THE CERVICAL SPINE WITHOUT CONTRAST 8/8/2020 11:05 pm TECHNIQUE: CT of the cervical spine was performed without the administration of intravenous contrast. Multiplanar reformatted images are provided for review. Dose modulation, iterative reconstruction, and/or weight based adjustment of the mA/kV was utilized to reduce the radiation dose to as low as reasonably achievable. COMPARISON: None. HISTORY: ORDERING SYSTEM PROVIDED HISTORY: neck . ,fall TECHNOLOGIST PROVIDED HISTORY: neck . ,fall Reason for Exam: fell, hit head, r/o cervical fx Acuity: Acute Type of Exam: Initial Mechanism of Injury: fall FINDINGS: BONES/ALIGNMENT: There is no acute fracture or traumatic malalignment. DEGENERATIVE CHANGES: Mild diffuse degenerative changes SOFT TISSUES: There is no prevertebral soft tissue swelling. No acute abnormality of the cervical spine. Interpretation per the Radiologist below, if available at the time of this note:    CT HEAD WO CONTRAST   Final Result   No acute intracranial abnormality. CT Cervical Spine WO Contrast   Final Result   No acute abnormality of the cervical spine. LABS:  Labs Reviewed - No data to display    All other labs were within normal range or not returned as of this dictation. EMERGENCY DEPARTMENT COURSE and DIFFERENTIAL DIAGNOSIS/MDM:   Vitals:    Vitals:    08/08/20 2249   BP: 125/70   Pulse: 84   Resp: 20   Temp: 98.4 °F (36.9 °C)   TempSrc: Oral   SpO2: 98%   Weight: 140 lb 9.6 oz (63.8 kg)   Height: 5' 7.5\" (1.715 m)       Medical Decision Making: Sutures out in 10 days. Watch for signs of infection redness, warmth, swelling and drainage    PROCEDURES:  Laceration Repair Procedure Note    Indication: Laceration    Procedure: The patient was placed in the appropriate position and anesthesia around the laceration was obtained by infiltration using 1% Lidocaine without epinephrine. The area was then cleansed with betadine and draped in a sterile fashion. The laceration was closed with 5-0prolene using interrupted sutures.  There were no additional lacerations requiring repair. The wound area was then dressed with a bandage. Total repaired wound length: 3 cm. Other Items: Suture count: 5    The patient tolerated the procedure well. Complications: None        FINAL IMPRESSION      1.  Laceration of scalp, initial encounter          DISPOSITION/PLAN   DISPOSITION Decision To Discharge 08/08/2020 11:55:52 PM      PATIENT REFERRED TO:   Nikita Cunningham DO  [address] [facility] [address]  [phone]    Schedule an appointment as soon as possible for a visit       [facility] ED  [address] [facility]  [phone]    If symptoms worsen      DISCHARGE MEDICATIONS:     Discharge Medication List as of 8/8/2020 11:56 PM              (Please note that portions of this note were completed with a voice recognition program.  Efforts were made to edit the dictations but occasionally words are mis-transcribed.)    Matilde Dickson MD  Certified Nurse Practitioner          [facility], APRN - CNP  08/09/20 0033       Janey Lux MD  08/11/20 Jaylene Romberg

## 2020-08-20 ENCOUNTER — HOSPITAL ENCOUNTER (EMERGENCY)
Age: 65
Discharge: HOME | End: 2020-08-20
Attending: EMERGENCY MEDICINE
Payer: MEDICAID

## 2020-08-20 VITALS
HEART RATE: 77 BPM | OXYGEN SATURATION: 98 % | SYSTOLIC BLOOD PRESSURE: 137 MMHG | WEIGHT: 141.2 LBS | RESPIRATION RATE: 16 BRPM | TEMPERATURE: 98.6 F | HEIGHT: 68 IN | BODY MASS INDEX: 21.4 KG/M2 | DIASTOLIC BLOOD PRESSURE: 64 MMHG

## 2020-08-20 PROCEDURE — 99281 EMR DPT VST MAYX REQ PHY/QHP: CPT

## 2020-08-20 ASSESSMENT — ENCOUNTER SYMPTOMS
FACIAL SWELLING: 0
VOMITING: 0
EYE REDNESS: 0
CONSTIPATION: 0
SHORTNESS OF BREATH: 0
COUGH: 0
DIARRHEA: 0
COLOR CHANGE: 0
ABDOMINAL PAIN: 0
EYE DISCHARGE: 0

## 2020-08-20 NOTE — ED TRIAGE NOTES
Pt to ED to have sutures removed from head. Pt AOx4. Laceration appears to be healing well. No redness or drainage noted. Patent airway, no dyspnea or cyanosis noted.

## 2020-08-20 NOTE — ED PROVIDER NOTES
[address] [place] [address] ED  EMERGENCY DEPARTMENT ENCOUNTER      Pt Name: Ester Kline  MRN: [id number]  [place] 1955  Date of evaluation: 8/20/2020  Provider: Joanie Blunt MD    CHIEF COMPLAINT       Chief Complaint   Patient presents with    Suture / Staple Removal         HISTORY OF PRESENT ILLNESS  (Location/Symptom, Timing/Onset, Context/Setting, Quality, Duration, Modifying Factors, Severity.)   Ester Kline is a 63 y.o. male who presents to the emergency department to the emergency department as recommended for suture removal.  He had 5 sutures placed in his scalp 12 days ago. He has had no problems since then. No bleeding or drainage. Denies pain. Nursing Notes were reviewed. ALLERGIES     Patient has no known allergies. CURRENT MEDICATIONS       Previous Medications    AMLODIPINE (NORVASC) 10 MG TABLET    Take 1 tablet by mouth daily    ATORVASTATIN (LIPITOR) 40 MG TABLET    take 1 tablet by mouth once daily    FOLIC ACID (FOLVITE) 1 MG TABLET    Take 1 tablet by mouth daily    VITAMIN B-1 (THIAMINE) 100 MG TABLET    Take 1 tablet by mouth daily       PAST MEDICAL HISTORY         Diagnosis Date    COPD (chronic obstructive pulmonary disease) ([facility] [address]        SURGICAL HISTORY           Procedure Laterality Date    JOINT REPLACEMENT      lt hip         FAMILY HISTORY     History reviewed. No pertinent family history. No family status information on file. SOCIAL HISTORY      reports that he has been smoking cigarettes. He has a 15.00 pack-year smoking history. He has never used smokeless tobacco. He reports current alcohol use of about 8.0 standard drinks of alcohol per week. He reports that he does not use drugs. REVIEW OF SYSTEMS    (2-9 systems for level 4, 10 or more for level 5)     Review of Systems   Constitutional: Negative for chills, fatigue and fever. HENT: Negative for congestion, ear discharge and facial swelling. Eyes: Negative for discharge and redness. time.   Psychiatric:         Behavior: Behavior normal.             DIAGNOSTIC RESULTS     EKG: All EKG's are interpreted by the Emergency Department Physician who either signs or Co-signs this chart in the absence of a cardiologist.    Not indicated    RADIOLOGY:   Non-plain film images such as CT, Ultrasound and MRI are read by the radiologist. Plain radiographic images are visualized and preliminarily interpreted by the emergency physician with the below findings:    Not indicated    Interpretation per the Radiologist below, if available at the time of this note:        LABS:  Labs Reviewed - No data to display    All other labs were within normal range or not returned as of this dictation. EMERGENCY DEPARTMENT COURSE and DIFFERENTIAL DIAGNOSIS/MDM:   Vitals:    Vitals:    08/20/20 1120 08/20/20 1122   BP: 137/64    Pulse: 77    Resp: 16    Temp: 98.6 °F (37 °C)    TempSrc: Oral    SpO2: 98%    Weight:  141 lb 3.2 oz (64 kg)   Height:  5' 8\" (1.727 m)       No orders of the defined types were placed in this encounter. Medical Decision Making: I have removed all the sutures without dehiscence. No complications. CONSULTS:  None    PROCEDURES:  None    FINAL IMPRESSION      1.  Visit for suture removal          DISPOSITION/PLAN   DISPOSITION Decision To Discharge 08/20/2020 11:54:08 AM      PATIENT REFERRED TO:   Augusto Suazo DO  [address] [place] [id number] [phone]      As needed    [facility] ED  [address] [facility]  [phone]    If symptoms worsen      DISCHARGE MEDICATIONS:     New Prescriptions    No medications on file         (Please note that portions of this note were completed with a voice recognition program.  Efforts were made to edit the dictations but occasionally words are mis-transcribed.)    Maria T Simmons MD  Attending Emergency Physician            Maria T Simmons MD  08/20/20 4910

## 2021-01-02 ENCOUNTER — HOSPITAL ENCOUNTER (EMERGENCY)
Age: 66
Discharge: HOME | End: 2021-01-02
Attending: EMERGENCY MEDICINE
Payer: MEDICAID

## 2021-01-02 ENCOUNTER — APPOINTMENT (OUTPATIENT)
Dept: GENERAL RADIOLOGY | Age: 66
End: 2021-01-02
Payer: MEDICAID

## 2021-01-02 VITALS
SYSTOLIC BLOOD PRESSURE: 143 MMHG | DIASTOLIC BLOOD PRESSURE: 78 MMHG | BODY MASS INDEX: 21.97 KG/M2 | OXYGEN SATURATION: 97 % | WEIGHT: 140 LBS | RESPIRATION RATE: 16 BRPM | HEART RATE: 72 BPM | HEIGHT: 67 IN | TEMPERATURE: 97.7 F

## 2021-01-02 DIAGNOSIS — S63.286A DISLOCATION OF PROXIMAL INTERPHALANGEAL JOINT OF RIGHT LITTLE FINGER, INITIAL ENCOUNTER: Primary | ICD-10-CM

## 2021-01-02 PROCEDURE — 2500000003 HC RX 250 WO HCPCS: Performed by: EMERGENCY MEDICINE

## 2021-01-02 PROCEDURE — 99283 EMERGENCY DEPT VISIT LOW MDM: CPT

## 2021-01-02 PROCEDURE — 26770 TREAT FINGER DISLOCATION: CPT

## 2021-01-02 PROCEDURE — 73130 X-RAY EXAM OF HAND: CPT

## 2021-01-02 RX ORDER — LIDOCAINE HYDROCHLORIDE 10 MG/ML
20 INJECTION, SOLUTION INFILTRATION; PERINEURAL ONCE
Status: COMPLETED | OUTPATIENT
Start: 2021-01-02 | End: 2021-01-02

## 2021-01-02 RX ADMIN — LIDOCAINE HYDROCHLORIDE 20 ML: 10 INJECTION, SOLUTION INFILTRATION; PERINEURAL at 04:30

## 2021-01-02 NOTE — ED NOTES
Patient presented to the ED with complaints of finger pain. He stated he fell a couple hours and landed on his hand and hurt his right side pinky. He thinks it is broken. Independent.  A&Ox4     Fern Best RN  01/02/21 0350

## 2021-01-02 NOTE — ED PROVIDER NOTES
EMERGENCY DEPARTMENT ENCOUNTER    Pt Name: Shabnam Odonnell  MRN: [id number]  [place] 1955  Date of evaluation: 1/2/21  CHIEF COMPLAINT       Chief Complaint   Patient presents with    Finger Pain     right hand     HISTORY OF PRESENT ILLNESS   Patient is a 28-year-old male who presents to the ED for evaluation of pain and inability to extend pinky finger right hand. Patient fell out of his wheelchair injuring his right finger. Right pinky finger is in flexed position and he is unable to extend it. No pain at rest.  Mild tenderness when manipulating finger. No numbness or tingling. No head strike, no LOC. No shortness of breath, chest pain, dental pain. REVIEW OF SYSTEMS     Review of Systems   All other systems reviewed and are negative. PASTMEDICAL HISTORY     Past Medical History:   Diagnosis Date    COPD (chronic obstructive pulmonary disease) ([facility] [address]      SURGICAL HISTORY       Past Surgical History:   Procedure Laterality Date    JOINT REPLACEMENT      lt hip     CURRENT MEDICATIONS       Previous Medications    AMLODIPINE (NORVASC) 10 MG TABLET    Take 1 tablet by mouth daily    ATORVASTATIN (LIPITOR) 40 MG TABLET    take 1 tablet by mouth once daily    FOLIC ACID (FOLVITE) 1 MG TABLET    Take 1 tablet by mouth daily    VITAMIN B-1 (THIAMINE) 100 MG TABLET    Take 1 tablet by mouth daily     ALLERGIES     has No Known Allergies. FAMILY HISTORY     has no family status information on file. SOCIAL HISTORY       Social History     Tobacco Use    Smoking status: Current Every Day Smoker     Packs/day: 0.50     Years: 30.00     Pack years: 15.00     Types: Cigarettes    Smokeless tobacco: Never Used   Substance Use Topics    Alcohol use:  Yes     Alcohol/week: 8.0 standard drinks     Types: 3 Cans of beer, 5 Shots of liquor per week     Comment: daily 1/2 bottle bourbon    Drug use: No     PHYSICAL EXAM     INITIAL VITALS: BP (!) 143/78   Pulse 72   Temp 97.7 °F (36.5 °C) (Oral)   Resp 16   Ht 5' 7\" (1.702 m)   Wt 140 lb (63.5 kg)   SpO2 97%   BMI 21.93 kg/m²    Physical Exam  HENT:      Head: Normocephalic. Right Ear: External ear normal.      Left Ear: External ear normal.      Nose: Nose normal.   Eyes:      Conjunctiva/sclera: Conjunctivae normal.   Cardiovascular:      Rate and Rhythm: Normal rate. Pulmonary:      Effort: Pulmonary effort is normal.   Abdominal:      General: Abdomen is flat. Musculoskeletal:      Comments: Flexion of fifth digit right hand at PIP joint. Skin:     General: Skin is dry. Neurological:      Mental Status: He is alert. Mental status is at baseline. Psychiatric:         Mood and Affect: Mood normal.         Behavior: Behavior normal.      Comments:     Limited exam secondary to Covid-19 pandemic. MEDICAL DECISION MAKING:   The patient is hemodynamically stable, afebrile, nontoxic-appearing. Physical exam notable for flexion fifth digit right hand PIP joint. Based on history and exam likely dislocation also consider fracture. ED plan for x-ray, reassess. DIAGNOSTIC RESULTS   EKG:All EKG's are interpreted by the Emergency Department Physician who either signs or Co-signs this chart in the absence of a cardiologist.        RADIOLOGY:All plain film, CT, MRI, and formal ultrasound images (except ED bedside ultrasound) are read by the radiologist, see reports below, unless otherwisenoted in MDM or here. XR HAND RIGHT (MIN 3 VIEWS)   Final Result   5th digit PIP dislocation. LABS: All lab results were reviewed by myself, and all abnormals are listed below. Labs Reviewed - No data to display    EMERGENCY DEPARTMENTCOURSE:   Patient did well in the ED. X-ray showed dislocation at the right PIP joint. Dislocation reduced by me in the ED department. No complications. Finger placed in splint. No further work-up indicated this time. Nursing notes reviewed.   At this time this is what I find, the patient appears well and does not appear sick or toxic. I gave my usual and customary discussion of the risks and benefits of discharge versus admission. I answered the patient's questions. I gave the patient strict return precautions. Patient expressed understanding of the discharge instructions. Dictated but not reviewed. Vitals:    Vitals:    01/02/21 0323 01/02/21 0324   BP:  (!) 143/78   Pulse: 72    Resp: 16    Temp: 97.7 °F (36.5 °C)    TempSrc: Oral    SpO2: 97%    Weight: 140 lb (63.5 kg)    Height: 5' 7\" (1.702 m)        The patient was given the following medications while in the emergency department:  Orders Placed This Encounter   Medications    lidocaine 1 % injection 20 mL     CONSULTS:  None    FINAL IMPRESSION      1.  Dislocation of proximal interphalangeal joint of right little finger, initial encounter          DISPOSITION/PLAN   DISPOSITION Decision To Discharge 01/02/2021 04:20:22 AM      PATIENT REFERRED TO:  Erin Diaz DO  [address] [place] [address].  [phone]    In 2 days      DISCHARGE MEDICATIONS:  New Prescriptions    No medications on file     Refkaylee Guevara MD  Attending Emergency Physician                    Mary Anne Marina MD  01/02/21 2225

## 2021-01-05 ENCOUNTER — CARE COORDINATION (OUTPATIENT)
Dept: CARE COORDINATION | Age: 66
End: 2021-01-05

## 2021-01-05 NOTE — CARE COORDINATION
First attempt to reach pt for care coordination assessment invalid number listed for pt will send mychart message

## 2021-01-12 ENCOUNTER — CARE COORDINATION (OUTPATIENT)
Dept: CARE COORDINATION | Age: 66
End: 2021-01-12

## 2021-01-12 SDOH — ECONOMIC STABILITY: INCOME INSECURITY: HOW HARD IS IT FOR YOU TO PAY FOR THE VERY BASICS LIKE FOOD, HOUSING, MEDICAL CARE, AND HEATING?: NOT HARD AT ALL

## 2021-01-12 SDOH — ECONOMIC STABILITY: FOOD INSECURITY: WITHIN THE PAST 12 MONTHS, THE FOOD YOU BOUGHT JUST DIDN'T LAST AND YOU DIDN'T HAVE MONEY TO GET MORE.: NEVER TRUE

## 2021-01-12 SDOH — HEALTH STABILITY: MENTAL HEALTH: HOW MANY STANDARD DRINKS CONTAINING ALCOHOL DO YOU HAVE ON A TYPICAL DAY?: 5 OR 6

## 2021-01-12 SDOH — HEALTH STABILITY: MENTAL HEALTH: HOW OFTEN DO YOU HAVE A DRINK CONTAINING ALCOHOL?: 4 OR MORE TIMES A WEEK

## 2021-01-12 NOTE — CARE COORDINATION
Spoke with pt who declines care coordination he did say he needs refills on his medications and was able to make him a follow up apt with his pcp.  He is able to get his meds and to his apt he is not ready to quit drinking at this time, but is considering cutting back he did not want assistance or care coordination

## 2021-03-15 ENCOUNTER — OFFICE VISIT (OUTPATIENT)
Dept: FAMILY MEDICINE CLINIC | Age: 66
End: 2021-03-15
Payer: MEDICAID

## 2021-03-15 VITALS
HEART RATE: 68 BPM | OXYGEN SATURATION: 97 % | SYSTOLIC BLOOD PRESSURE: 126 MMHG | DIASTOLIC BLOOD PRESSURE: 77 MMHG | BODY MASS INDEX: 20.25 KG/M2 | TEMPERATURE: 96.6 F | HEIGHT: 67 IN | WEIGHT: 129 LBS

## 2021-03-15 DIAGNOSIS — E78.5 HYPERLIPIDEMIA, UNSPECIFIED HYPERLIPIDEMIA TYPE: ICD-10-CM

## 2021-03-15 DIAGNOSIS — R79.89 LFT ELEVATION: ICD-10-CM

## 2021-03-15 DIAGNOSIS — L60.9 NAIL ABNORMALITY: ICD-10-CM
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
## 2021-03-15 DIAGNOSIS — F10.10 ALCOHOL ABUSE: ICD-10-CM

## 2021-03-15 DIAGNOSIS — I10 ESSENTIAL HYPERTENSION: Primary | ICD-10-CM

## 2021-03-15 DIAGNOSIS — S60.141A CONTUSION OF RIGHT RING FINGER WITH DAMAGE TO NAIL, INITIAL ENCOUNTER: ICD-10-CM

## 2021-03-15 PROCEDURE — G8427 DOCREV CUR MEDS BY ELIG CLIN: HCPCS | Performed by: FAMILY MEDICINE

## 2021-03-15 PROCEDURE — 4004F PT TOBACCO SCREEN RCVD TLK: CPT | Performed by: FAMILY MEDICINE

## 2021-03-15 PROCEDURE — G8484 FLU IMMUNIZE NO ADMIN: HCPCS | Performed by: FAMILY MEDICINE

## 2021-03-15 PROCEDURE — 3017F COLORECTAL CA SCREEN DOC REV: CPT | Performed by: FAMILY MEDICINE

## 2021-03-15 PROCEDURE — G8420 CALC BMI NORM PARAMETERS: HCPCS | Performed by: FAMILY MEDICINE

## 2021-03-15 PROCEDURE — 99214 OFFICE O/P EST MOD 30 MIN: CPT | Performed by: FAMILY MEDICINE

## 2021-03-15 ASSESSMENT — PATIENT HEALTH QUESTIONNAIRE - PHQ9
1. LITTLE INTEREST OR PLEASURE IN DOING THINGS: 0
SUM OF ALL RESPONSES TO PHQ QUESTIONS 1-9: 0

## 2021-03-15 NOTE — PROGRESS NOTES
For Your Well-Being    Dear Becky Doss     Preparing the Skin Before Surgery: Chlorhexidine 4 % Soap      Preparing the skin before surgery decreases the risk of infection. The steps below outline what you need to do.  · Do not shave any body parts (legs, underarms, or genitals) for at least 2 days before surgery. Men may shave their  faces.  · Remove all jewelry the night before your surgery.  · Remove all makeup, including lipstick and all nail polish (fingers and toes).  · Follow protocol  below, as indicated by your doctor:             Use product first on:3/30/17           (the night before surgery)           Use produce next on: 3/31/17           (the morning of surgery)    ·  If desired,you may shower or bathe at least 1 hour before using this special soap.  · Use only freshly laundered towels and bed sheets, put on freshly laundered clothes after showering  · Do not apply any lotion, powder, perfume, makeup, nail polish, hair gel, hairspray or deodorant after bathing.  · Breast-feeding:Wash nipples thoroughly with water before breast-feeding.  INSTRUCTIONS    1.  Wet your body and hair with warm water.    2.  Wash your hair and face as you regularly do. Rinse thoroughly. These steps are done first so that the Chlorhexidine soap isn't washed off by your shampoo or face wash.    3.  Turn the water off.    4.  Apply the Chlorhexidine soap to your entire body from your chin down using a clean washcloth or sponges.    5.  Avoid contact with your eyes, ears, and mouth    6.  Be sure to include under your armpits, behind your knees, between skin folds, under fingernails, and your groin area (Where legs meet the body). The soap will not bubble or lather very much, and that is fine.     7.   Leave soap on your body for 3 minutes.    8.   Rinse thoroughly with warm water. Do not wash with any other soap or cleanser.    9.   Dry your body with a clean, freshly laundered towel.    10.  Put on freshly  [facility]  [address] [place] [address] Dr DOSS [address] [facility] [address]  Dept: [address] Bella Helton is a 63 y.o. male who presents today for follow up on his  medical conditions as noted below. Chief Complaint   Patient presents with    Constipation    Abdominal Pain    Back Pain       Patient Active Problem List:     Chronic obstructive pulmonary disease (HCC)     Smoking     Essential hypertension     Oral thrush     Hyperlipidemia     Closed nondisplaced fracture of proximal phalanx of left great toe     Alcohol abuse     LFT elevation     Abdominal wall hematoma     Tinnitus of both ears     Past Medical History:   Diagnosis Date    COPD (chronic obstructive pulmonary disease) ([facility] [address]       Past Surgical History:   Procedure Laterality Date    JOINT REPLACEMENT      lt hip     No family history on file. Current Outpatient Medications   Medication Sig Dispense Refill    vitamin B-1 (THIAMINE) 100 MG tablet Take 1 tablet by mouth daily (Patient not taking: Reported on 6/10/2020) 30 tablet 3    folic acid (FOLVITE) 1 MG tablet Take 1 tablet by mouth daily (Patient not taking: Reported on 3/15/2021) 30 tablet 3    atorvastatin (LIPITOR) 40 MG tablet take 1 tablet by mouth once daily (Patient not taking: Reported on 3/15/2021) 90 tablet 3     No current facility-administered medications for this visit. ALLERGIES:  No Known Allergies    Social History     Tobacco Use    Smoking status: Current Every Day Smoker     Packs/day: 0.50     Years: 30.00     Pack years: 15.00     Types: Cigarettes    Smokeless tobacco: Never Used   Substance Use Topics    Alcohol use:  Yes     Alcohol/week: 5.0 standard drinks     Types: 5 Shots of liquor per week     Frequency: 4 or more times a week     Drinks per session: 5 or 6     Binge frequency: Daily or almost daily     Comment: daily 1/2 bottle bourbon        LDL Cholesterol (mg/dL)   Date Value 02/24/2020 37     HDL (mg/dL)   Date Value   02/24/2020 94     BUN (mg/dL)   Date Value   06/10/2020 7 (L)     CREATININE (mg/dL)   Date Value   06/10/2020 0.87     Glucose (mg/dL)   Date Value   06/10/2020 97     Hemoglobin A1C (%)   Date Value   02/24/2020 6.0              Subjective:      HPI  He is being seen today for follow-up of all of his ongoing medical problems unfortunately he has continued to drink and is decompensated his weight is down he appears quite thin and malnourished. His brother has come to town from [place] to try and help him out a little bit he has not been taking any of his medications although his blood pressure is well controlled he is due for laboratory studies he states he has been falling a lot and his back hurts any and he did something to his fifth finger couple months ago and now he cannot straighten it  He cannot trim his own toenails and apparently they are needing help    Review of Systems:     Constitutional: Negative for fever, appetite change and fatigue. Family social and medical history reviewed and unchanged     HENT: Negative. Negative for nosebleeds, trouble swallowing and neck pain. Eyes: Negative for photophobia and visual disturbance. Respiratory: Negative. Negative for chest tightness and shortness of breath. Cardiovascular: Negative. Negative for chest pain and leg swelling. Gastrointestinal: Negative. Negative for abdominal pain and blood in stool. Endocrine: Negative for cold intolerance and polyuria. Genitourinary: Negative for dysuria and hematuria. Musculoskeletal: Negative. Skin: Negative for rash. Allergic/Immunologic: Negative. Neurological: Negative. Negative for dizziness, weakness and numbness. Hematological: Negative. Negative for adenopathy. Does not bruise/bleed easily. Psychiatric/Behavioral: Negative for sleep disturbance, dysphoric mood and  decreased concentration. The patient is not nervous/anxious. laundered clothes that are comfortable and loose fitting.       An example of Chlorhexidine 4% soap include:  1) Chlorhexidine 4% - 4 oz bottle - use 2 oz or 1/2 oz of the bottle per shower.    Objective:     Physical Exam:     Nursing note and vitals reviewed. /77   Pulse 68   Temp 96.6 °F (35.9 °C)   Ht 5' 7\" (1.702 m)   Wt 129 lb (58.5 kg)   SpO2 97%   BMI 20.20 kg/m²   Constitutional: He is oriented to person, place, and time. He   appears well-developed and well-nourished. HENT:   Head: Normocephalic and atraumatic. Right Ear: External ear normal. Tympanic membrane is not erythematous. No middle ear effusion. Left Ear: External ear normal. Tympanic membrane is not erythematous. No middle ear effusion. Nose: No mucosal edema. Mouth/Throat: Oropharynx is clear and moist. No posterior oropharyngeal erythema. Eyes: Conjunctivae and EOM are normal. Pupils are equal, round, and reactive to light. Neck: Normal range of motion. Neck supple. No thyromegaly present. Cardiovascular: Normal rate, regular rhythm and normal heart sounds. No murmur heard. Pulmonary/Chest: Effort normal and breath sounds normal. He has no wheezes. Hehas no rales. Abdominal: Soft. Bowel sounds are normal. He exhibits no distension and no mass. There is no tenderness. There is no rebound and no guarding. Genitourinary/Anorectal:deferred  Musculoskeletal: Normal range of motion. He exhibits no edema or tenderness. Right fifth finger is swollen at the PIP   Lymphadenopathy: He has no cervical adenopathy. Neurological: He is alert and oriented to person, place, and time. He has normal reflexes. Skin: Skin is warm and dry. No rash noted. Psychiatric: He has a normal mood and affect. His   behavior is normal.       Assessment:      1. Essential hypertension    2. Hyperlipidemia, unspecified hyperlipidemia type    3. LFT elevation    4. Alcohol abuse    5. Nail abnormality    6. Contusion of right ring finger with damage to nail, initial encounter    7. Fall, initial encounter          Plan:      Call or return to clinic prn if these symptoms worsen or fail to improve as anticipated.   I have reviewed the instructions with the patient, answering all questions to his satisfaction. No follow-ups on file. Orders Placed This Encounter   Procedures    US LIVER     This procedure can be scheduled via [facility]. Access your [facility] account by visiting [email or web address]. Standing Status:   Future     Standing Expiration Date:   3/15/2022    XR HAND RIGHT (2 VIEWS)     Standing Status:   Future     Standing Expiration Date:   3/15/2022     Order Specific Question:   Reason for exam:     Answer:   contusion finger    XR LUMBAR SPINE (2-3 VIEWS)     Standing Status:   Future     Standing Expiration Date:   3/15/2022     Order Specific Question:   Reason for exam:     Answer:   fall    CBC Auto Differential     Standing Status:   Future     Standing Expiration Date:   9/15/2021    Comprehensive Metabolic Panel     Standing Status:   Future     Standing Expiration Date:   9/15/2021    Lipid Panel     Standing Status:   Future     Standing Expiration Date:   4/15/2021     Order Specific Question:   Is Patient Fasting?/# of Hours     Answer: Yes    PSA, Prostatic Specific Antigen     Standing Status:   Future     Standing Expiration Date:   9/15/2021    T4, Free     Standing Status:   Future     Standing Expiration Date:   9/15/2021    Testosterone, Free     Standing Status:   Future     Standing Expiration Date:   4/15/2021    TSH without Reflex     Standing Status:   Future     Standing Expiration Date:   9/15/2021   Ayan Cam GT     Standing Status:   Future     Standing Expiration Date:   9/15/2021    [facility] - Kem Lopez DPM, Podiatry, Roxy Michael (Ángela Espinal)     Referral Priority:   Routine     Referral Type:   Eval and Treat     Referral Reason:   Specialty Services Required     Referred to Provider:   Maxime Hopkins DPM     Requested Specialty:   Podiatry     Number of Visits Requested:   1     No orders of the defined types were placed in this encounter.     Discussed with

## 2021-03-22 ENCOUNTER — HOSPITAL ENCOUNTER (OUTPATIENT)
Dept: ULTRASOUND IMAGING | Age: 66
Discharge: HOME | End: 2021-03-24
Payer: MEDICAID

## 2021-03-22 ENCOUNTER — HOSPITAL ENCOUNTER (OUTPATIENT)
Dept: GENERAL RADIOLOGY | Age: 66
Discharge: HOME | End: 2021-03-24
Payer: MEDICAID

## 2021-03-22 ENCOUNTER — HOSPITAL ENCOUNTER (OUTPATIENT)
Age: 66
Discharge: HOME | End: 2021-03-22
Payer: MEDICAID

## 2021-03-22 ENCOUNTER — HOSPITAL ENCOUNTER (OUTPATIENT)
Age: 66
Discharge: HOME | End: 2021-03-24
Payer: MEDICAID

## 2021-03-22 DIAGNOSIS — S60.141A CONTUSION OF RIGHT RING FINGER WITH DAMAGE TO NAIL, INITIAL ENCOUNTER: ICD-10-CM
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
## 2021-03-22 DIAGNOSIS — I10 ESSENTIAL HYPERTENSION: ICD-10-CM

## 2021-03-22 DIAGNOSIS — F10.10 ALCOHOL ABUSE: ICD-10-CM

## 2021-03-22 DIAGNOSIS — R79.89 LFT ELEVATION: ICD-10-CM

## 2021-03-22 DIAGNOSIS — E78.5 HYPERLIPIDEMIA, UNSPECIFIED HYPERLIPIDEMIA TYPE: ICD-10-CM

## 2021-03-22 LAB
ABSOLUTE EOS #: 0.2 K/UL (ref 0–0.44)
ABSOLUTE IMMATURE GRANULOCYTE: <0.03 K/UL (ref 0–0.3)
ABSOLUTE LYMPH #: 1.92 K/UL (ref 1.1–3.7)
ABSOLUTE MONO #: 0.63 K/UL (ref 0.1–1.2)
ALBUMIN SERPL-MCNC: 4.3 G/DL (ref 3.5–5.2)
ALBUMIN/GLOBULIN RATIO: 1.4 (ref 1–2.5)
ALP BLD-CCNC: 148 U/L (ref 40–129)
ALT SERPL-CCNC: 49 U/L (ref 5–41)
ANION GAP SERPL CALCULATED.3IONS-SCNC: 9 MMOL/L (ref 9–17)
AST SERPL-CCNC: 40 U/L
BASOPHILS # BLD: 1 % (ref 0–2)
BASOPHILS ABSOLUTE: 0.06 K/UL (ref 0–0.2)
BILIRUB SERPL-MCNC: 1.19 MG/DL (ref 0.3–1.2)
BUN BLDV-MCNC: 20 MG/DL (ref 8–23)
BUN/CREAT BLD: ABNORMAL (ref 9–20)
CALCIUM SERPL-MCNC: 9.2 MG/DL (ref 8.6–10.4)
CHLORIDE BLD-SCNC: 100 MMOL/L (ref 98–107)
CHOLESTEROL/HDL RATIO: 2.9
CHOLESTEROL: 150 MG/DL
CO2: 26 MMOL/L (ref 20–31)
CREAT SERPL-MCNC: 0.76 MG/DL (ref 0.7–1.2)
DIFFERENTIAL TYPE: ABNORMAL
EOSINOPHILS RELATIVE PERCENT: 3 % (ref 1–4)
GFR AFRICAN AMERICAN: >60 ML/MIN
GFR NON-AFRICAN AMERICAN: >60 ML/MIN
GFR SERPL CREATININE-BSD FRML MDRD: ABNORMAL ML/MIN/{1.73_M2}
GFR SERPL CREATININE-BSD FRML MDRD: ABNORMAL ML/MIN/{1.73_M2}
GGT: 240 U/L (ref 8–61)
GLUCOSE BLD-MCNC: 95 MG/DL (ref 70–99)
HCT VFR BLD CALC: 43.5 % (ref 40.7–50.3)
HDLC SERPL-MCNC: 52 MG/DL
HEMOGLOBIN: 13.4 G/DL (ref 13–17)
IMMATURE GRANULOCYTES: 0 %
LDL CHOLESTEROL: 78 MG/DL (ref 0–130)
LYMPHOCYTES # BLD: 28 % (ref 24–43)
MCH RBC QN AUTO: 27.9 PG (ref 25.2–33.5)
MCHC RBC AUTO-ENTMCNC: 30.8 G/DL (ref 28.4–34.8)
MCV RBC AUTO: 90.4 FL (ref 82.6–102.9)
MONOCYTES # BLD: 9 % (ref 3–12)
NRBC AUTOMATED: 0 PER 100 WBC
PDW BLD-RTO: 16.4 % (ref 11.8–14.4)
PLATELET # BLD: 317 K/UL (ref 138–453)
PLATELET ESTIMATE: ABNORMAL
PMV BLD AUTO: 10.2 FL (ref 8.1–13.5)
POTASSIUM SERPL-SCNC: 4.7 MMOL/L (ref 3.7–5.3)
PROSTATE SPECIFIC ANTIGEN: 1.64 UG/L
RBC # BLD: 4.81 M/UL (ref 4.21–5.77)
RBC # BLD: ABNORMAL 10*6/UL
SEG NEUTROPHILS: 59 % (ref 36–65)
SEGMENTED NEUTROPHILS ABSOLUTE COUNT: 4.15 K/UL (ref 1.5–8.1)
SEX HORMONE BINDING GLOBULIN: 86 NMOL/L (ref 11–80)
SODIUM BLD-SCNC: 135 MMOL/L (ref 135–144)
TESTOSTERONE FREE-NONMALE: 53.1 PG/ML (ref 47–244)
TESTOSTERONE TOTAL: 510 NG/DL (ref 220–1000)
THYROXINE, FREE: 0.94 NG/DL (ref 0.93–1.7)
TOTAL PROTEIN: 7.3 G/DL (ref 6.4–8.3)
TRIGL SERPL-MCNC: 101 MG/DL
TSH SERPL DL<=0.05 MIU/L-ACNC: 4.89 MIU/L (ref 0.3–5)
VLDLC SERPL CALC-MCNC: NORMAL MG/DL (ref 1–30)
WBC # BLD: 7 K/UL (ref 3.5–11.3)
WBC # BLD: ABNORMAL 10*3/UL

## 2021-03-22 PROCEDURE — 76705 ECHO EXAM OF ABDOMEN: CPT

## 2021-03-22 PROCEDURE — 85025 COMPLETE CBC W/AUTO DIFF WBC: CPT

## 2021-03-22 PROCEDURE — 84403 ASSAY OF TOTAL TESTOSTERONE: CPT

## 2021-03-22 PROCEDURE — 82977 ASSAY OF GGT: CPT

## 2021-03-22 PROCEDURE — 84439 ASSAY OF FREE THYROXINE: CPT

## 2021-03-22 PROCEDURE — 84443 ASSAY THYROID STIM HORMONE: CPT

## 2021-03-22 PROCEDURE — 80061 LIPID PANEL: CPT

## 2021-03-22 PROCEDURE — 80053 COMPREHEN METABOLIC PANEL: CPT

## 2021-03-22 PROCEDURE — 36415 COLL VENOUS BLD VENIPUNCTURE: CPT

## 2021-03-22 PROCEDURE — 84270 ASSAY OF SEX HORMONE GLOBUL: CPT

## 2021-03-22 PROCEDURE — 72100 X-RAY EXAM L-S SPINE 2/3 VWS: CPT

## 2021-03-22 PROCEDURE — 84153 ASSAY OF PSA TOTAL: CPT

## 2021-03-22 PROCEDURE — 73120 X-RAY EXAM OF HAND: CPT

## 2021-03-23 ENCOUNTER — TELEPHONE (OUTPATIENT)
Dept: FAMILY MEDICINE CLINIC | Age: 66
End: 2021-03-23

## 2021-03-23 DIAGNOSIS — S32.000A COMPRESSION FRACTURE OF LUMBAR VERTEBRA, INITIAL ENCOUNTER, UNSPECIFIED LUMBAR VERTEBRAL LEVEL: Primary | ICD-10-CM

## 2021-03-23 NOTE — TELEPHONE ENCOUNTER
Gave pt results of Xrays. Informed pt of Dr. Jolley Prime referral and follow up MRI. Order pended but please review dx codes.

## 2021-03-24 ENCOUNTER — TELEPHONE (OUTPATIENT)
Dept: FAMILY MEDICINE CLINIC | Age: 66
End: 2021-03-24

## 2021-03-24 NOTE — TELEPHONE ENCOUNTER
Dr. Kika Chris office does not accept patients insurance. Patient called insurance and was given a list of doctors that accept his insurance. Patient will call back with doctors name.

## 2021-04-10 ENCOUNTER — HOSPITAL ENCOUNTER (OUTPATIENT)
Dept: MRI IMAGING | Age: 66
Discharge: HOME | End: 2021-04-12
Payer: MEDICAID

## 2021-04-10 DIAGNOSIS — S32.000A COMPRESSION FRACTURE OF LUMBAR VERTEBRA, INITIAL ENCOUNTER, UNSPECIFIED LUMBAR VERTEBRAL LEVEL: ICD-10-CM

## 2021-04-10 PROCEDURE — 72148 MRI LUMBAR SPINE W/O DYE: CPT

## 2021-08-23 ENCOUNTER — TELEPHONE (OUTPATIENT)
Dept: FAMILY MEDICINE CLINIC | Age: 66
End: 2021-08-23

## 2021-08-23 ENCOUNTER — OFFICE VISIT (OUTPATIENT)
Dept: FAMILY MEDICINE CLINIC | Age: 66
End: 2021-08-23
Payer: MEDICAID

## 2021-08-23 VITALS
RESPIRATION RATE: 16 BRPM | OXYGEN SATURATION: 99 % | WEIGHT: 130 LBS | HEART RATE: 85 BPM | SYSTOLIC BLOOD PRESSURE: 130 MMHG | BODY MASS INDEX: 20.4 KG/M2 | HEIGHT: 67 IN | DIASTOLIC BLOOD PRESSURE: 82 MMHG

## 2021-08-23 DIAGNOSIS — M70.61 GREATER TROCHANTERIC BURSITIS OF RIGHT HIP: Primary | ICD-10-CM

## 2021-08-23 DIAGNOSIS — J44.9 CHRONIC OBSTRUCTIVE PULMONARY DISEASE, UNSPECIFIED COPD TYPE (HCC): ICD-10-CM

## 2021-08-23 PROCEDURE — 3023F SPIROM DOC REV: CPT | Performed by: FAMILY MEDICINE

## 2021-08-23 PROCEDURE — G8926 SPIRO NO PERF OR DOC: HCPCS | Performed by: FAMILY MEDICINE

## 2021-08-23 PROCEDURE — G8420 CALC BMI NORM PARAMETERS: HCPCS | Performed by: FAMILY MEDICINE

## 2021-08-23 PROCEDURE — 3017F COLORECTAL CA SCREEN DOC REV: CPT | Performed by: FAMILY MEDICINE

## 2021-08-23 PROCEDURE — G8427 DOCREV CUR MEDS BY ELIG CLIN: HCPCS | Performed by: FAMILY MEDICINE

## 2021-08-23 PROCEDURE — 4004F PT TOBACCO SCREEN RCVD TLK: CPT | Performed by: FAMILY MEDICINE

## 2021-08-23 PROCEDURE — 99213 OFFICE O/P EST LOW 20 MIN: CPT | Performed by: FAMILY MEDICINE

## 2021-08-23 PROCEDURE — 20610 DRAIN/INJ JOINT/BURSA W/O US: CPT | Performed by: FAMILY MEDICINE

## 2021-08-23 RX ORDER — CYCLOBENZAPRINE HCL 10 MG
10 TABLET ORAL NIGHTLY
Qty: 30 TABLET | Refills: 0 | Status: SHIPPED | OUTPATIENT
Start: 2021-08-23 | End: 2021-09-22

## 2021-08-23 RX ORDER — METHYLPREDNISOLONE ACETATE 40 MG/ML
40 INJECTION, SUSPENSION INTRA-ARTICULAR; INTRALESIONAL; INTRAMUSCULAR; SOFT TISSUE ONCE
Status: COMPLETED | OUTPATIENT
Start: 2021-08-23 | End: 2021-08-23

## 2021-08-23 RX ADMIN — METHYLPREDNISOLONE ACETATE 40 MG: 40 INJECTION, SUSPENSION INTRA-ARTICULAR; INTRALESIONAL; INTRAMUSCULAR; SOFT TISSUE at 14:40

## 2021-08-23 ASSESSMENT — PATIENT HEALTH QUESTIONNAIRE - PHQ9
SUM OF ALL RESPONSES TO PHQ QUESTIONS 1-9: 0
2. FEELING DOWN, DEPRESSED OR HOPELESS: 0
SUM OF ALL RESPONSES TO PHQ QUESTIONS 1-9: 0
SUM OF ALL RESPONSES TO PHQ9 QUESTIONS 1 & 2: 0
SUM OF ALL RESPONSES TO PHQ QUESTIONS 1-9: 0
1. LITTLE INTEREST OR PLEASURE IN DOING THINGS: 0

## 2021-08-23 NOTE — PROGRESS NOTES
[facility]  [address] [place] [address] [address] [address]  Dept: [address] Anabelle Palmer is a 61 y.o. male who presents today for follow up on his  medical conditions as noted below. Chief Complaint   Patient presents with    Hip Pain       Patient Active Problem List:     Chronic obstructive pulmonary disease (HCC)     Smoking     Essential hypertension     Oral thrush     Hyperlipidemia     Closed nondisplaced fracture of proximal phalanx of left great toe     Alcohol abuse     LFT elevation     Abdominal wall hematoma     Tinnitus of both ears     Past Medical History:   Diagnosis Date    COPD (chronic obstructive pulmonary disease) ([address]       Past Surgical History:   Procedure Laterality Date    JOINT REPLACEMENT      lt hip     History reviewed. No pertinent family history. Current Outpatient Medications   Medication Sig Dispense Refill    cyclobenzaprine (FLEXERIL) 10 MG tablet Take 1 tablet by mouth nightly 30 tablet 0     No current facility-administered medications for this visit. ALLERGIES:  No Known Allergies    Social History     Tobacco Use    Smoking status: Current Every Day Smoker     Packs/day: 0.50     Years: 30.00     Pack years: 15.00     Types: Cigarettes    Smokeless tobacco: Never Used   Substance Use Topics    Alcohol use:  Yes     Alcohol/week: 5.0 standard drinks     Types: 5 Shots of liquor per week     Comment: daily 1/2 bottle bourbon        LDL Cholesterol (mg/dL)   Date Value   03/22/2021 78     HDL (mg/dL)   Date Value   03/22/2021 52     BUN (mg/dL)   Date Value   03/22/2021 20     CREATININE (mg/dL)   Date Value   03/22/2021 0.76     Glucose (mg/dL)   Date Value   03/22/2021 95     Hemoglobin A1C (%)   Date Value   02/24/2020 6.0              Subjective:      HPI  He is here today stating he is having right lateral hip discomfort going on for the last week or so cut it is started out of the blue now it is quite quite painful  He has not really taken anything to help with it    Review of Systems:     Constitutional: Negative for fever, appetite change and fatigue. Family social and medical history reviewed and unchanged     HENT: Negative. Negative for nosebleeds, trouble swallowing and neck pain. Eyes: Negative for photophobia and visual disturbance. Respiratory: Negative. Negative for chest tightness and shortness of breath. Cardiovascular: Negative. Negative for chest pain and leg swelling. Gastrointestinal: Negative. Negative for abdominal pain and blood in stool. Endocrine: Negative for cold intolerance and polyuria. Genitourinary: Negative for dysuria and hematuria. Musculoskeletal: Negative. Skin: Negative for rash. Allergic/Immunologic: Negative. Neurological: Negative. Negative for dizziness, weakness and numbness. Hematological: Negative. Negative for adenopathy. Does not bruise/bleed easily. Psychiatric/Behavioral: Negative for sleep disturbance, dysphoric mood and  decreased concentration. The patient is not nervous/anxious. Objective:     Physical Exam:     Nursing note and vitals reviewed. /82   Pulse 85   Resp 16   Ht 5' 7\" (1.702 m)   Wt 130 lb (59 kg)   SpO2 99%   BMI 20.36 kg/m²   Constitutional: He is oriented to person, place, and time. He   appears well-developed and well-nourished. HENT:   Head: Normocephalic and atraumatic. Right Ear: External ear normal. Tympanic membrane is not erythematous. No middle ear effusion. Left Ear: External ear normal. Tympanic membrane is not erythematous. No middle ear effusion. Nose: No mucosal edema. Mouth/Throat: Oropharynx is clear and moist. No posterior oropharyngeal erythema. Eyes: Conjunctivae and EOM are normal. Pupils are equal, round, and reactive to light. Neck: Normal range of motion. Neck supple. No thyromegaly present.    Cardiovascular: Normal rate, regular rhythm and normal heart sounds. No murmur heard. Pulmonary/Chest: Effort normal and breath sounds normal. He has no wheezes. Hehas no rales. Abdominal: Soft. Bowel sounds are normal. He exhibits no distension and no mass. There is no tenderness. There is no rebound and no guarding. Genitourinary/Anorectal:deferred  Musculoskeletal: decreased  range of motion. He exhibits no edema or +tenderness. He is very tender on the greater trochanteric area of the right leg  Lymphadenopathy: He has no cervical adenopathy. Neurological: He is alert and oriented to person, place, and time. He has normal reflexes. Skin: Skin is warm and dry. No rash noted. Psychiatric: He has a normal mood and affect. His   behavior is normal.       Assessment:      1. Greater trochanteric bursitis of right hip    2. Chronic obstructive pulmonary disease, unspecified COPD type ([facility]          Plan:      Call or return to clinic prn if these symptoms worsen or fail to improve as anticipated. I have reviewed the instructions with the patient, answering all questions to his satisfaction. No follow-ups on file. Orders Placed This Encounter   Procedures    20610 - GA DRAIN/INJECT LARGE JOINT/BURSA     Orders Placed This Encounter   Medications    cyclobenzaprine (FLEXERIL) 10 MG tablet     Sig: Take 1 tablet by mouth nightly     Dispense:  30 tablet     Refill:  0     Joint injection procedure: Informed consent obtained. trochanteric bursa right is identified and marked. Cleansed  with betadine, sprayed with ethyl chloride. Injected with 9-1, 9 cc of 1% lidocaine, 1 cc 40mg DepoMedrol injected into joint space. Good hemostasis. Pt tolerated well. Dressing applied. Post injection instructions given to patient.     Electronically signed by Zainab Marks DO on 8/23/2021 at 2:37 PM

## 2022-05-27 ENCOUNTER — TELEPHONE (OUTPATIENT)
Dept: FAMILY MEDICINE CLINIC | Age: 67
End: 2022-05-27

## 2022-11-19 ENCOUNTER — HOSPITAL ENCOUNTER (EMERGENCY)
Age: 67
Discharge: HOME | End: 2022-11-19
Attending: EMERGENCY MEDICINE
Payer: COMMERCIAL

## 2022-11-19 ENCOUNTER — APPOINTMENT (OUTPATIENT)
Dept: GENERAL RADIOLOGY | Age: 67
End: 2022-11-19
Payer: COMMERCIAL

## 2022-11-19 VITALS
TEMPERATURE: 98 F | HEART RATE: 60 BPM | BODY MASS INDEX: 21.19 KG/M2 | OXYGEN SATURATION: 98 % | RESPIRATION RATE: 18 BRPM | SYSTOLIC BLOOD PRESSURE: 123 MMHG | WEIGHT: 135 LBS | HEIGHT: 67 IN | DIASTOLIC BLOOD PRESSURE: 67 MMHG

## 2022-11-19 DIAGNOSIS — S86.911A KNEE STRAIN, RIGHT, INITIAL ENCOUNTER: Primary | ICD-10-CM

## 2022-11-19 PROCEDURE — 99283 EMERGENCY DEPT VISIT LOW MDM: CPT

## 2022-11-19 PROCEDURE — 73562 X-RAY EXAM OF KNEE 3: CPT

## 2022-11-19 RX ORDER — PREDNISONE 50 MG/1
50 TABLET ORAL DAILY
Qty: 5 TABLET | Refills: 0 | Status: SHIPPED | OUTPATIENT
Start: 2022-11-19 | End: 2022-11-24

## 2022-11-19 RX ORDER — ACETAMINOPHEN 500 MG
1000 TABLET ORAL ONCE
Status: DISCONTINUED | OUTPATIENT
Start: 2022-11-19 | End: 2022-11-19 | Stop reason: HOSPADM

## 2022-11-19 RX ORDER — IBUPROFEN 800 MG/1
800 TABLET ORAL ONCE
Status: DISCONTINUED | OUTPATIENT
Start: 2022-11-19 | End: 2022-11-19 | Stop reason: HOSPADM

## 2022-11-19 ASSESSMENT — PAIN DESCRIPTION - LOCATION: LOCATION: KNEE

## 2022-11-19 ASSESSMENT — PAIN SCALES - GENERAL: PAINLEVEL_OUTOF10: 5

## 2022-11-19 ASSESSMENT — PAIN DESCRIPTION - ORIENTATION: ORIENTATION: RIGHT

## 2022-11-19 ASSESSMENT — PAIN - FUNCTIONAL ASSESSMENT: PAIN_FUNCTIONAL_ASSESSMENT: 0-10

## 2022-11-19 NOTE — ED PROVIDER NOTES
EMERGENCY DEPARTMENT ENCOUNTER    Pt Name: Jey Segundo  MRN: [id number]  [place] 1955  Date of evaluation: 11/19/22  CHIEF COMPLAINT       Chief Complaint   Patient presents with    Knee Injury     Sts slipped on pickle sts did not fall but twisted knee     HISTORY OF PRESENT ILLNESS   Patient is a 82-year-old male who presents to the ED with right knee pain. Patient reports 1 day of right knee pain. Pain started after slipping on a pickle chip from a hamburger at work yesterday morning. His right leg slipped out in front and ahead of him catching him bending over falling to the ground. Since that time he reports pain and swelling to right knee. Pain worse with standing and walking. Pain improves at rest.  No numbness or tingling or weakness reported. REVIEW OF SYSTEMS     Review of Systems   All other systems reviewed and are negative. PASTMEDICAL HISTORY     Past Medical History:   Diagnosis Date    COPD (chronic obstructive pulmonary disease) ([facility] [address]      SURGICAL HISTORY       Past Surgical History:   Procedure Laterality Date    JOINT REPLACEMENT      lt hip     CURRENT MEDICATIONS       Discharge Medication List as of 11/19/2022 11:17 AM        ALLERGIES     has No Known Allergies. FAMILY HISTORY     has no family status information on file. SOCIAL HISTORY       Social History     Tobacco Use    Smoking status: Every Day     Packs/day: 0.50     Years: 30.00     Pack years: 15.00     Types: Cigarettes    Smokeless tobacco: Never   Vaping Use    Vaping Use: Never used   Substance Use Topics    Alcohol use: Yes     Alcohol/week: 5.0 standard drinks     Types: 5 Shots of liquor per week     Comment: daily 1/2 bottle bourbon    Drug use: No     PHYSICAL EXAM     INITIAL VITALS: /67   Pulse 60   Temp 98 °F (36.7 °C) (Oral)   Resp 18   Ht 5' 7\" (1.702 m)   Wt 135 lb (61.2 kg)   SpO2 98%   BMI 21.14 kg/m²    Physical Exam  Constitutional:       Appearance: Normal appearance.    HENT: Head: Normocephalic. Right Ear: External ear normal.      Left Ear: External ear normal.      Nose: Nose normal.   Eyes:      Conjunctiva/sclera: Conjunctivae normal.   Cardiovascular:      Rate and Rhythm: Normal rate. Pulmonary:      Effort: Pulmonary effort is normal.   Abdominal:      General: Abdomen is flat. Musculoskeletal:         General: Swelling and tenderness (Swelling superior aspect right knee, tenderness inferior aspect right knee, normal flexion/extension of right knee. +2 right DP pulses. Motor/sensory 5/5 left lower extremity.) present. Cervical back: No muscular tenderness. Skin:     General: Skin is dry. Neurological:      Mental Status: He is alert. Mental status is at baseline. Psychiatric:         Mood and Affect: Mood normal.         Behavior: Behavior normal.       MEDICAL DECISION MAKING:   Temperature was 98, pulse was 60, and blood pressure was 123/67. Pulse oximetry on room air was 98%. Physical exam notable for swelling and tenderness to right knee. Based on history and exam most likely muscle strain, less likely acute osseous injury. Plan for x-ray, analgesia, reassess. CRITICAL CARE:     The 30 ml/kg fluid bolus is not ordered due to concern for fluid overload and/or heart failure. PROCEDURES:    Procedures    DIAGNOSTIC RESULTS   EKG:All EKG's are interpreted by the Emergency Department Physician who either signs or Co-signs this chart in the absence of a cardiologist.        RADIOLOGY:All plain film, CT, MRI, and formal ultrasound images (except ED bedside ultrasound) are read by the radiologist, see reports below, unless otherwisenoted in MDM or here. XR KNEE RIGHT (3 VIEWS)   Final Result   No acute abnormality of the knee. LABS: All lab results were reviewed by myself, and all abnormals are listed below. Labs Reviewed - No data to display    EMERGENCY DEPARTMENTCOURSE:   Patient did well in the ED.   X-ray negative for acute osseous injury. Knee wrapped in Ace bandage, given crutches. Given Rx for prednisone. Advised NSAIDs. Given work note. Advised to follow-up with his [facility]. No further work-up indicated at this time. Nursing notes reviewed. At this time this is what I find, the patient appears well and does not appear sick or toxic. I gave my usual and customary discussion of the risks and benefits of discharge versus admission. I answered the patient's questions. I gave the patient strict return precautions. Patient expressed understanding of the discharge instructions.           Vitals:    Vitals:    11/19/22 0934   BP: 123/67   Pulse: 60   Resp: 18   Temp: 98 °F (36.7 °C)   TempSrc: Oral   SpO2: 98%   Weight: 135 lb (61.2 kg)   Height: 5' 7\" (1.702 m)       The patient was given the following medications while in the emergency department:  Orders Placed This Encounter   Medications    DISCONTD: acetaminophen (TYLENOL) tablet 1,000 mg    DISCONTD: ibuprofen (ADVIL;MOTRIN) tablet 800 mg    predniSONE (DELTASONE) 50 MG tablet     Sig: Take 1 tablet by mouth daily for 5 days     Dispense:  5 tablet     Refill:  0     CONSULTS:  None    FINAL IMPRESSION      1. Knee strain, right, initial encounter          DISPOSITION/PLAN   DISPOSITION Decision To Discharge 11/19/2022 11:15:17 AM      PATIENT REFERRED TO:  Armani Betancourt DO  [address] [place] [address] 22.  [phone]    In 2 days      [address] [address]  [phone]  In 1 day    DISCHARGE MEDICATIONS:  Discharge Medication List as of 11/19/2022 11:17 AM        START taking these medications    Details   predniSONE (DELTASONE) 50 MG tablet Take 1 tablet by mouth daily for 5 days, Disp-5 tablet, R-0Normal           Martinez Paige MD  Attending Emergency Physician                    Tomas Gar MD  11/19/22 200 Amy Crowley MD  11/28/22 2008

## 2022-11-19 NOTE — Clinical Note
Eusebia Nichole was seen and treated in our emergency department on 11/19/2022. He may return to work on 11/22/2022. If you have any questions or concerns, please don't hesitate to call.       Kenny Jesus MD

## 2022-11-19 NOTE — DISCHARGE INSTRUCTIONS
Follow this medication plan for 3 days to control your pain: (warning - be mindful of over-the-counter medications that contain Tylenol. 9am Tylenol 1000 mg  12noon Ibuprofen 800 mg  3pm Tylenol 1000 mg  6pm Ibuprofen 800 mg  Return to this emergency room immediately if your symptoms persist, worsen or if new ones form. Make sure you follow-up with your primary care doctor within the next 1-2 business days.

## 2022-11-21 ENCOUNTER — OFFICE VISIT (OUTPATIENT)
Dept: FAMILY MEDICINE CLINIC | Age: 67
End: 2022-11-21
Payer: MEDICAID

## 2022-11-21 VITALS
DIASTOLIC BLOOD PRESSURE: 74 MMHG | HEART RATE: 58 BPM | SYSTOLIC BLOOD PRESSURE: 139 MMHG | OXYGEN SATURATION: 99 % | HEIGHT: 67 IN | WEIGHT: 135 LBS | BODY MASS INDEX: 21.19 KG/M2

## 2022-11-21 DIAGNOSIS — J44.9 CHRONIC OBSTRUCTIVE PULMONARY DISEASE, UNSPECIFIED COPD TYPE (HCC): ICD-10-CM

## 2022-11-21 DIAGNOSIS — E78.5 HYPERLIPIDEMIA, UNSPECIFIED HYPERLIPIDEMIA TYPE: ICD-10-CM

## 2022-11-21 DIAGNOSIS — I10 ESSENTIAL HYPERTENSION: Primary | ICD-10-CM

## 2022-11-21 DIAGNOSIS — Z23 NEED FOR VACCINATION: ICD-10-CM

## 2022-11-21 DIAGNOSIS — R79.89 LFT ELEVATION: ICD-10-CM

## 2022-11-21 DIAGNOSIS — Z13.1 SCREENING FOR DIABETES MELLITUS: ICD-10-CM

## 2022-11-21 LAB — HBA1C MFR BLD: 5.6 %

## 2022-11-21 PROCEDURE — G8427 DOCREV CUR MEDS BY ELIG CLIN: HCPCS | Performed by: FAMILY MEDICINE

## 2022-11-21 PROCEDURE — 99214 OFFICE O/P EST MOD 30 MIN: CPT | Performed by: FAMILY MEDICINE

## 2022-11-21 PROCEDURE — 83036 HEMOGLOBIN GLYCOSYLATED A1C: CPT | Performed by: FAMILY MEDICINE

## 2022-11-21 PROCEDURE — 3078F DIAST BP <80 MM HG: CPT | Performed by: FAMILY MEDICINE

## 2022-11-21 PROCEDURE — G8484 FLU IMMUNIZE NO ADMIN: HCPCS | Performed by: FAMILY MEDICINE

## 2022-11-21 PROCEDURE — 3023F SPIROM DOC REV: CPT | Performed by: FAMILY MEDICINE

## 2022-11-21 PROCEDURE — 1123F ACP DISCUSS/DSCN MKR DOCD: CPT | Performed by: FAMILY MEDICINE

## 2022-11-21 PROCEDURE — 3074F SYST BP LT 130 MM HG: CPT | Performed by: FAMILY MEDICINE

## 2022-11-21 PROCEDURE — 4004F PT TOBACCO SCREEN RCVD TLK: CPT | Performed by: FAMILY MEDICINE

## 2022-11-21 PROCEDURE — 90694 VACC AIIV4 NO PRSRV 0.5ML IM: CPT | Performed by: FAMILY MEDICINE

## 2022-11-21 PROCEDURE — 3017F COLORECTAL CA SCREEN DOC REV: CPT | Performed by: FAMILY MEDICINE

## 2022-11-21 PROCEDURE — 90471 IMMUNIZATION ADMIN: CPT | Performed by: FAMILY MEDICINE

## 2022-11-21 PROCEDURE — G8420 CALC BMI NORM PARAMETERS: HCPCS | Performed by: FAMILY MEDICINE

## 2022-11-21 SDOH — ECONOMIC STABILITY: FOOD INSECURITY: WITHIN THE PAST 12 MONTHS, YOU WORRIED THAT YOUR FOOD WOULD RUN OUT BEFORE YOU GOT MONEY TO BUY MORE.: NEVER TRUE

## 2022-11-21 SDOH — ECONOMIC STABILITY: FOOD INSECURITY: WITHIN THE PAST 12 MONTHS, THE FOOD YOU BOUGHT JUST DIDN'T LAST AND YOU DIDN'T HAVE MONEY TO GET MORE.: NEVER TRUE

## 2022-11-21 ASSESSMENT — PATIENT HEALTH QUESTIONNAIRE - PHQ9
SUM OF ALL RESPONSES TO PHQ QUESTIONS 1-9: 0
SUM OF ALL RESPONSES TO PHQ QUESTIONS 1-9: 0
1. LITTLE INTEREST OR PLEASURE IN DOING THINGS: 0
2. FEELING DOWN, DEPRESSED OR HOPELESS: 0
SUM OF ALL RESPONSES TO PHQ QUESTIONS 1-9: 0
SUM OF ALL RESPONSES TO PHQ QUESTIONS 1-9: 0
SUM OF ALL RESPONSES TO PHQ9 QUESTIONS 1 & 2: 0

## 2022-11-21 ASSESSMENT — SOCIAL DETERMINANTS OF HEALTH (SDOH): HOW HARD IS IT FOR YOU TO PAY FOR THE VERY BASICS LIKE FOOD, HOUSING, MEDICAL CARE, AND HEATING?: NOT HARD AT ALL

## 2022-11-21 NOTE — PROGRESS NOTES
[facility]  [address] [place] [address] [address] [address]  Dept: [address] Juan Velazquez is a 74 y.o. male who presents today for follow up on his  medical conditions as noted below. Chief Complaint   Patient presents with    Knee Injury     EF Saturday       Patient Active Problem List:     Chronic obstructive pulmonary disease ([facility] [address]     Smoking     Essential hypertension     Oral thrush     Hyperlipidemia     Closed nondisplaced fracture of proximal phalanx of left great toe     Alcohol abuse     LFT elevation     Abdominal wall hematoma     Tinnitus of both ears     Past Medical History:   Diagnosis Date    COPD (chronic obstructive pulmonary disease) ([facility] [address]       Past Surgical History:   Procedure Laterality Date    JOINT REPLACEMENT      lt hip     No family history on file. Current Outpatient Medications   Medication Sig Dispense Refill    predniSONE (DELTASONE) 50 MG tablet Take 1 tablet by mouth daily for 5 days 5 tablet 0     No current facility-administered medications for this visit. ALLERGIES:  No Known Allergies    Social History     Tobacco Use    Smoking status: Every Day     Packs/day: 0.50     Years: 30.00     Pack years: 15.00     Types: Cigarettes    Smokeless tobacco: Never   Substance Use Topics    Alcohol use:  Yes     Alcohol/week: 5.0 standard drinks     Types: 5 Shots of liquor per week     Comment: daily 1/2 bottle bourbon        LDL Cholesterol (mg/dL)   Date Value   03/22/2021 78     HDL (mg/dL)   Date Value   03/22/2021 52     BUN (mg/dL)   Date Value   03/22/2021 20     Creatinine (mg/dL)   Date Value   03/22/2021 0.76     Glucose (mg/dL)   Date Value   03/22/2021 95     Hemoglobin A1C (%)   Date Value   02/24/2020 6.0              Subjective:      HPI  He is here today originally for a knee injury that is a Workmen's Comp. case because he did it at work and does have a claim so for that he will need to go to the Workmen's Comp. physicians  He was also due for follow-up on his ongoing medical problems he states he is drinking less only 1 beer a day. He stopped taking his blood pressure medications his pressure seem to be controlled and he is due for blood work    Review of Systems:     Constitutional: Negative for fever, appetite change and fatigue. Family social and medical history reviewed and unchanged     HENT: Negative. Negative for nosebleeds, trouble swallowing and neck pain. Eyes: Negative for photophobia and visual disturbance. Respiratory: Negative. Negative for chest tightness and shortness of breath. Cardiovascular: Negative. Negative for chest pain and leg swelling. Gastrointestinal: Negative. Negative for abdominal pain and blood in stool. Endocrine: Negative for cold intolerance and polyuria. Genitourinary: Negative for dysuria and hematuria. Musculoskeletal: Negative. Skin: Negative for rash. Allergic/Immunologic: Negative. Neurological: Negative. Negative for dizziness, weakness and numbness. Hematological: Negative. Negative for adenopathy. Does not bruise/bleed easily. Psychiatric/Behavioral: Negative for sleep disturbance, dysphoric mood and  decreased concentration. The patient is not nervous/anxious. Objective:     Physical Exam:     Nursing note and vitals reviewed. /74   Pulse 58   Ht 5' 7\" (1.702 m)   Wt 135 lb (61.2 kg)   SpO2 99%   BMI 21.14 kg/m²   Constitutional: He is oriented to person, place, and time. He   appears well-developed and well-nourished. HENT:   Head: Normocephalic and atraumatic. Right Ear: External ear normal. Tympanic membrane is not erythematous. No middle ear effusion. Left Ear: External ear normal. Tympanic membrane is not erythematous. No middle ear effusion. Nose: No mucosal edema. Mouth/Throat: Oropharynx is clear and moist. No posterior oropharyngeal erythema.     Eyes: Conjunctivae and EOM are normal. Pupils are equal, round, and reactive to light. Neck: Normal range of motion. Neck supple. No thyromegaly present. Cardiovascular: Normal rate, regular rhythm and normal heart sounds. No murmur heard. Pulmonary/Chest: Effort normal and breath sounds normal. He has no wheezes. Hehas no rales. Abdominal: Soft. Bowel sounds are normal. He exhibits no distension and no mass. There is no tenderness. There is no rebound and no guarding. Genitourinary/Anorectal:deferred  Musculoskeletal: Normal range of motion. He exhibits no edema or tenderness. Lymphadenopathy: He has no cervical adenopathy. Neurological: He is alert and oriented to person, place, and time. He has normal reflexes. Skin: Skin is warm and dry. No rash noted. Psychiatric: He has a normal mood and affect. His   behavior is normal.       Assessment:      1. Essential hypertension    2. Hyperlipidemia, unspecified hyperlipidemia type    3. Screening for diabetes mellitus    4. LFT elevation    5. Chronic obstructive pulmonary disease, unspecified COPD type ([facility] [address]    6. Need for vaccination          Plan:      Call or return to clinic prn if these symptoms worsen or fail to improve as anticipated. I have reviewed the instructions with the patient, answering all questions to his satisfaction. No follow-ups on file.   Orders Placed This Encounter   Procedures    Influenza, FLUAD, (age 72 y+), IM, Preservative Free, 0.5 mL    CBC with Auto Differential     Standing Status:   Future     Standing Expiration Date:   5/21/2023    Comprehensive Metabolic Panel     Standing Status:   Future     Standing Expiration Date:   5/21/2023    Lipid Panel     Standing Status:   Future     Standing Expiration Date:   12/21/2022     Order Specific Question:   Is Patient Fasting?/# of Hours     Answer:   Yes    PSA, Prostatic Specific Antigen     Standing Status:   Future     Standing Expiration Date:   5/21/2023    T4, Free     Standing Status: Future     Standing Expiration Date:   5/21/2023    Testosterone, Free     Standing Status:   Future     Standing Expiration Date:   12/21/2022    TSH     Standing Status:   Future     Standing Expiration Date:   5/21/2023    POCT glycosylated hemoglobin (Hb A1C)     No orders of the defined types were placed in this encounter.   Given a flu shot  Get labs done and follow-up as needed    Electronically signed by Judie Reno DO on 11/21/2022 at 1:42 PM

## 2022-12-02 ENCOUNTER — TELEPHONE (OUTPATIENT)
Dept: FAMILY MEDICINE CLINIC | Age: 67
End: 2022-12-02

## 2022-12-02 NOTE — TELEPHONE ENCOUNTER
----- Message from Kalebparth Willis sent at 12/2/2022  8:42 AM EST -----  Subject: Message to Provider    QUESTIONS  Information for Provider? kumar with [address] needs to follow up with   the nurse to see if he has been seen in the office since his ED visit. Please call Gita Bundy. ---------------------------------------------------------------------------  --------------  [facility]  [phone]; OK to leave message on voicemail  ---------------------------------------------------------------------------  --------------  SCRIPT ANSWERS  Relationship to Patient? Third Party  Third Party Type? Insurance? Representative Name?  Gita Bundy

## 2022-12-02 NOTE — TELEPHONE ENCOUNTER
Office returned and spoke with Nora Rangel, she was informed that provider does not evaluate patients for [facility], she requested office note and also advised that Josefina Milner would handle the expense of visit if denied by patient insurance, any further questions pls call [phone].

## 2022-12-02 NOTE — TELEPHONE ENCOUNTER
----- Message from Tatyana Mccollum sent at 12/2/2022  8:42 AM EST -----  Subject: Message to Provider    QUESTIONS  Information for Provider? kumar with [address] needs to follow up with   the nurse to see if he has been seen in the office since his ED visit. Please call Dipika Castle. ---------------------------------------------------------------------------  --------------  Sonja Quezada INFO  [phone]; OK to leave message on voicemail  ---------------------------------------------------------------------------  --------------  SCRIPT ANSWERS  Relationship to Patient? Third Party  Third Party Type? Insurance? Representative Name?  Dipika Castle

## 2022-12-02 NOTE — TELEPHONE ENCOUNTER
----- Message from Kip Annika sent at 12/2/2022  8:42 AM EST -----  Subject: Message to Provider    QUESTIONS  Information for Provider? kumar with [address] needs to follow up with   the nurse to see if he has been seen in the office since his ED visit. Please call Bryant Muse. ---------------------------------------------------------------------------  --------------  [facility]  [phone]; OK to leave message on voicemail  ---------------------------------------------------------------------------  --------------  SCRIPT ANSWERS  Relationship to Patient? Third Party  Third Party Type? Insurance? Representative Name?  Bryant Muse

## 2022-12-06 ENCOUNTER — HOSPITAL ENCOUNTER (OUTPATIENT)
Age: 67
Discharge: HOME | End: 2022-12-06
Payer: MEDICAID

## 2022-12-06 DIAGNOSIS — Z13.1 SCREENING FOR DIABETES MELLITUS: ICD-10-CM

## 2022-12-06 DIAGNOSIS — E78.5 HYPERLIPIDEMIA, UNSPECIFIED HYPERLIPIDEMIA TYPE: ICD-10-CM

## 2022-12-06 DIAGNOSIS — I10 ESSENTIAL HYPERTENSION: ICD-10-CM

## 2022-12-06 DIAGNOSIS — R79.89 LFT ELEVATION: ICD-10-CM

## 2022-12-06 DIAGNOSIS — J44.9 CHRONIC OBSTRUCTIVE PULMONARY DISEASE, UNSPECIFIED COPD TYPE (HCC): ICD-10-CM

## 2022-12-06 LAB
ABSOLUTE EOS #: 0.3 K/UL (ref 0–0.44)
ABSOLUTE IMMATURE GRANULOCYTE: 0.03 K/UL (ref 0–0.3)
ABSOLUTE LYMPH #: 2 K/UL (ref 1.1–3.7)
ABSOLUTE MONO #: 0.53 K/UL (ref 0.1–1.2)
ALBUMIN SERPL-MCNC: 4.5 G/DL (ref 3.5–5.2)
ALBUMIN/GLOBULIN RATIO: 1.6 (ref 1–2.5)
ALP BLD-CCNC: 75 U/L (ref 40–129)
ALT SERPL-CCNC: 17 U/L (ref 5–41)
ANION GAP SERPL CALCULATED.3IONS-SCNC: 13 MMOL/L (ref 9–17)
AST SERPL-CCNC: 18 U/L
BASOPHILS # BLD: 1 % (ref 0–2)
BASOPHILS ABSOLUTE: 0.05 K/UL (ref 0–0.2)
BILIRUB SERPL-MCNC: 0.9 MG/DL (ref 0.3–1.2)
BUN BLDV-MCNC: 15 MG/DL (ref 8–23)
CALCIUM SERPL-MCNC: 9 MG/DL (ref 8.6–10.4)
CHLORIDE BLD-SCNC: 103 MMOL/L (ref 98–107)
CHOLESTEROL/HDL RATIO: 2.9
CHOLESTEROL: 201 MG/DL
CO2: 25 MMOL/L (ref 20–31)
CREAT SERPL-MCNC: 0.92 MG/DL (ref 0.7–1.2)
EOSINOPHILS RELATIVE PERCENT: 4 % (ref 1–4)
GFR SERPL CREATININE-BSD FRML MDRD: >60 ML/MIN/1.73M2
GLUCOSE BLD-MCNC: 99 MG/DL (ref 70–99)
HCT VFR BLD CALC: 48 % (ref 40.7–50.3)
HDLC SERPL-MCNC: 69 MG/DL
HEMOGLOBIN: 15.8 G/DL (ref 13–17)
IMMATURE GRANULOCYTES: 0 %
LDL CHOLESTEROL: 117 MG/DL (ref 0–130)
LYMPHOCYTES # BLD: 28 % (ref 24–43)
MCH RBC QN AUTO: 30.7 PG (ref 25.2–33.5)
MCHC RBC AUTO-ENTMCNC: 32.9 G/DL (ref 28.4–34.8)
MCV RBC AUTO: 93.2 FL (ref 82.6–102.9)
MONOCYTES # BLD: 7 % (ref 3–12)
NRBC AUTOMATED: 0 PER 100 WBC
PDW BLD-RTO: 13.2 % (ref 11.8–14.4)
PLATELET # BLD: 235 K/UL (ref 138–453)
PMV BLD AUTO: 9.6 FL (ref 8.1–13.5)
POTASSIUM SERPL-SCNC: 4.6 MMOL/L (ref 3.7–5.3)
PROSTATE SPECIFIC ANTIGEN: 3.57 NG/ML
RBC # BLD: 5.15 M/UL (ref 4.21–5.77)
SEG NEUTROPHILS: 60 % (ref 36–65)
SEGMENTED NEUTROPHILS ABSOLUTE COUNT: 4.33 K/UL (ref 1.5–8.1)
SEX HORMONE BINDING GLOBULIN: 60 NMOL/L (ref 11–80)
SODIUM BLD-SCNC: 141 MMOL/L (ref 135–144)
TESTOSTERONE FREE-NONMALE: 89.2 PG/ML (ref 47–244)
TESTOSTERONE TOTAL: 618 NG/DL (ref 220–1000)
THYROXINE, FREE: 1.08 NG/DL (ref 0.93–1.7)
TOTAL PROTEIN: 7.3 G/DL (ref 6.4–8.3)
TRIGL SERPL-MCNC: 76 MG/DL
TSH SERPL DL<=0.05 MIU/L-ACNC: 2.99 UIU/ML (ref 0.3–5)
WBC # BLD: 7.2 K/UL (ref 3.5–11.3)

## 2022-12-06 PROCEDURE — 80053 COMPREHEN METABOLIC PANEL: CPT

## 2022-12-06 PROCEDURE — 84153 ASSAY OF PSA TOTAL: CPT

## 2022-12-06 PROCEDURE — 84270 ASSAY OF SEX HORMONE GLOBUL: CPT

## 2022-12-06 PROCEDURE — 84439 ASSAY OF FREE THYROXINE: CPT

## 2022-12-06 PROCEDURE — 80061 LIPID PANEL: CPT

## 2022-12-06 PROCEDURE — 84443 ASSAY THYROID STIM HORMONE: CPT

## 2022-12-06 PROCEDURE — 84403 ASSAY OF TOTAL TESTOSTERONE: CPT

## 2022-12-06 PROCEDURE — 36415 COLL VENOUS BLD VENIPUNCTURE: CPT

## 2022-12-06 PROCEDURE — 85025 COMPLETE CBC W/AUTO DIFF WBC: CPT

## 2022-12-07 DIAGNOSIS — R97.20 ELEVATED PSA: Primary | ICD-10-CM
